# Patient Record
Sex: FEMALE | NOT HISPANIC OR LATINO | Employment: OTHER | URBAN - NONMETROPOLITAN AREA
[De-identification: names, ages, dates, MRNs, and addresses within clinical notes are randomized per-mention and may not be internally consistent; named-entity substitution may affect disease eponyms.]

---

## 2017-08-14 ENCOUNTER — OFFICE VISIT (OUTPATIENT)
Dept: MEDICAL GROUP | Facility: PHYSICIAN GROUP | Age: 67
End: 2017-08-14
Payer: MEDICARE

## 2017-08-14 VITALS
RESPIRATION RATE: 16 BRPM | BODY MASS INDEX: 32.14 KG/M2 | OXYGEN SATURATION: 97 % | WEIGHT: 200 LBS | HEART RATE: 74 BPM | TEMPERATURE: 98.1 F | DIASTOLIC BLOOD PRESSURE: 84 MMHG | HEIGHT: 66 IN | SYSTOLIC BLOOD PRESSURE: 136 MMHG

## 2017-08-14 DIAGNOSIS — I10 ESSENTIAL HYPERTENSION: ICD-10-CM

## 2017-08-14 DIAGNOSIS — E03.9 HYPOTHYROIDISM, UNSPECIFIED TYPE: ICD-10-CM

## 2017-08-14 DIAGNOSIS — Z12.31 ENCOUNTER FOR SCREENING MAMMOGRAM FOR MALIGNANT NEOPLASM OF BREAST: ICD-10-CM

## 2017-08-14 DIAGNOSIS — E78.5 DYSLIPIDEMIA: ICD-10-CM

## 2017-08-14 PROCEDURE — 99214 OFFICE O/P EST MOD 30 MIN: CPT | Performed by: FAMILY MEDICINE

## 2017-08-14 RX ORDER — HYDROCHLOROTHIAZIDE 25 MG/1
25 TABLET ORAL DAILY
Qty: 90 TAB | Refills: 3 | Status: SHIPPED | OUTPATIENT
Start: 2017-08-14 | End: 2018-07-21 | Stop reason: SDUPTHER

## 2017-08-14 RX ORDER — LEVOTHYROXINE SODIUM 0.2 MG/1
200 TABLET ORAL DAILY
Qty: 90 TAB | Refills: 3 | Status: SHIPPED | OUTPATIENT
Start: 2017-08-14 | End: 2018-09-19 | Stop reason: SDUPTHER

## 2017-08-14 ASSESSMENT — PATIENT HEALTH QUESTIONNAIRE - PHQ9: CLINICAL INTERPRETATION OF PHQ2 SCORE: 0

## 2017-08-14 NOTE — ASSESSMENT & PLAN NOTE
Recent labs at [place]        HDL 53  Since HDL above 40 advised pt to continue with exercise and diet changes.

## 2017-08-14 NOTE — MR AVS SNAPSHOT
"        Mojgan Murdockario   2017 2:20 PM   Office Visit   MRN: [id number]    Department:  [facility]   Dept Phone:  [phone]    Description:  Female : 1950   Provider:  Mike Valdez M.D.           Reason for Visit     Results labs    Medication Refill           Allergies as of 2017     Allergen Noted Reactions    Betadine [Povidone Iodine] 10/23/2009   Contact Dermatitis    Sulfa Drugs 10/23/2009   Rash      You were diagnosed with     Essential hypertension   [1744359]       Hypothyroidism, unspecified type   [9512568]       Dyslipidemia   [813686]       Encounter for screening mammogram for malignant neoplasm of breast   [[id number]]         Vital Signs     Blood Pressure Pulse Temperature Respirations Height Weight    136/84 mmHg 74 36.7 °C (98.1 °F) 16 1.676 m (5' 5.98\") 90.719 kg (200 lb)    Body Mass Index Oxygen Saturation Smoking Status             32.30 kg/m2 97% Former Smoker         Basic Information     Date Of Birth Sex Race Ethnicity Preferred Language    1950 Female Unknown Non- English      Your appointments     2018 10:40 AM   Established Patient with Mike Valdez M.D.   [address] [address] [phone]           You will be receiving a confirmation call a few days before your appointment from our automated call confirmation system.              Problem List              ICD-10-CM Priority Class Noted - Resolved    HTN (hypertension) I10   Unknown - Present    Hypothyroid E03.9   Unknown - Present    Plantar fasciitis M72.2   6/3/2010 - Present    Senile nuclear sclerosis H25.10   3/17/2015 - Present    Dyslipidemia E78.5   2017 - Present      Health Maintenance        Date Due Completion Dates    IMM DTaP/Tdap/Td Vaccine (1 - Tdap) 1971 ---    IMM ZOSTER VACCINE 2012 ---    MAMMOGRAM 2015 (Done), 2013 (Prv Comp)    Override on 2014: " Done (gbi)    Override on 7/9/2013: Previously completed    PAP SMEAR 10/9/2016 10/9/2013 (Declined)    Override on 10/9/2013: Patient Declined    BONE DENSITY 5/24/2017 ---    IMM PNEUMOCOCCAL 65+ (ADULT) LOW/MEDIUM RISK SERIES (1 of 2 - PCV13) 5/24/2017 ---    IMM INFLUENZA (1) 9/1/2017 ---    COLONOSCOPY 10/9/2023 10/9/2013 (Declined)    Override on 10/9/2013: Patient Declined            Current Immunizations     No immunizations on file.      Below and/or attached are the medications your provider expects you to take. Review all of your home medications and newly ordered medications with your provider and/or pharmacist. Follow medication instructions as directed by your provider and/or pharmacist. Please keep your medication list with you and share with your provider. Update the information when medications are discontinued, doses are changed, or new medications (including over-the-counter products) are added; and carry medication information at all times in the event of emergency situations     Allergies:  BETADINE - Contact Dermatitis     SULFA DRUGS - Rash               Medications  Valid as of: August 14, 2017 -  3:02 PM    Generic Name Brand Name Tablet Size Instructions for use    Aspirin (Tab) aspirin 81 MG Take 81 mg by mouth every day.        Coenzyme Q10 (Cap) coenzyme Q-10 30 MG Take 100 mg by mouth every day.        Cyclobenzaprine HCl (Tab) FLEXERIL 10 MG TAKE ONE TABLET BY MOUTH THREE TIMES DAILY AS NEEDED FOR MILD PAIN        Diclofenac Sodium (Tablet Delayed Response) VOLTAREN 75 MG Take 1 Tab by mouth 2 times a day.        HydroCHLOROthiazide (Tab) HYDRODIURIL 25 MG Take 1 Tab by mouth every day.        Hydrocodone-Acetaminophen (Tab) NORCO 5-325 MG Take 1-2 Tabs by mouth every 6 hours as needed.        Levothyroxine Sodium (Tab) SYNTHROID 200 MCG Take 1 Tab by mouth every day.        Multiple Vitamin   Take  by mouth.        Multiple Vitamins-Minerals (Tab) THERAGRAN-M  Take 1 Tab by mouth  every day.        Naproxen (Tab) NAPROSYN 500 MG Take 500 mg by mouth as needed.        Non Formulary Request Non Formulary Request  Take 595 mg by mouth every day. Potassium        Non Formulary Request Non Formulary Request  Take 12.5 mg by mouth every day. IODINE        Potassium Chloride   Take  by mouth.        Red Yeast Rice Extract (Tab) Red Yeast Rice 600 MG Take 1,200 mg by mouth every day.        TraMADol HCl (Tab) ULTRAM 50 MG Take 1 Tab by mouth at bedtime as needed for Mild Pain.        .                 Medicines prescribed today were sent to:     [facility] [address] [place] [address], [place] - [address] [facility]    [address] [facility] [place] [address]    Phone: [phone] Fax: [phone]    Open 24 Hours?: No      Medication refill instructions:       If your prescription bottle indicates you have medication refills left, it is not necessary to call your provider’s office. Please contact your pharmacy and they will refill your medication.    If your prescription bottle indicates you do not have any refills left, you may request refills at any time through one of the following ways: The online [employer] system (except Urgent Care), by calling your provider’s office, or by asking your pharmacy to contact your provider’s office with a refill request. Medication refills are processed only during regular business hours and may not be available until the next business day. Your provider may request additional information or to have a follow-up visit with you prior to refilling your medication.   *Please Note: Medication refills are assigned a new Rx number when refilled electronically. Your pharmacy may indicate that no refills were authorized even though a new prescription for the same medication is available at the pharmacy. Please request the medicine by name with the pharmacy before contacting your provider for a refill.           [employer] Access Code: Activation code not generated  Current [employer]  Status: Active

## 2017-08-14 NOTE — ASSESSMENT & PLAN NOTE
Well controlled. She takes hctz 25 mg   Continues on aspirin 81mg  She used to be a heavy smoker but quit >10 years ago  No chest pain with exercise.   No swelling in her legs.   She has gradually lost 40 lbs with exercise like swimming.

## 2017-08-16 ENCOUNTER — TELEPHONE (OUTPATIENT)
Dept: MEDICAL GROUP | Facility: PHYSICIAN GROUP | Age: 67
End: 2017-08-16

## 2017-08-16 DIAGNOSIS — R01.1 SYSTOLIC MURMUR: ICD-10-CM

## 2017-08-16 NOTE — PROGRESS NOTES
Subjective:   Mojgan Pierre is a 67 y.o. female here today for evaluation and management of:     HTN (hypertension)  Well controlled. She takes hctz 25 mg   Continues on aspirin 81mg  She used to be a heavy smoker but quit >10 years ago  No chest pain with exercise.   No swelling in her legs.   She has gradually lost 40 lbs with exercise like swimming.     Hypothyroid  TSH 1.10 normal range on levothyroxine 200mcg daily.     Dyslipidemia  Recent labs at [place]        HDL 53  Since HDL above 40 advised pt to continue with exercise and diet changes.          Current medicines (including changes today)  Current Outpatient Prescriptions   Medication Sig Dispense Refill   • aspirin 81 MG tablet Take 81 mg by mouth every day.     • Multiple Vitamin (MULTI VITAMIN PO) Take  by mouth.     • hydrochlorothiazide (HYDRODIURIL) 25 MG Tab Take 1 Tab by mouth every day. 90 Tab 3   • levothyroxine (SYNTHROID) 200 MCG Tab Take 1 Tab by mouth every day. 90 Tab 3   • POTASSIUM CHLORIDE PO Take  by mouth.     • hydrocodone-acetaminophen (NORCO) 5-325 MG TABS per tablet Take 1-2 Tabs by mouth every 6 hours as needed. 20 Tab 0   • diclofenac EC (VOLTAREN) 75 MG TBEC Take 1 Tab by mouth 2 times a day. 30 Tab 0   • naproxen (NAPROSYN) 500 MG TABS Take 500 mg by mouth as needed.     • therapeutic multivitamin-minerals (THERAGRAN-M) TABS Take 1 Tab by mouth every day.     • Red Yeast Rice 600 MG TABS Take 1,200 mg by mouth every day.     • coenzyme Q-10 30 MG capsule Take 100 mg by mouth every day.     • Non Formulary Request Take 595 mg by mouth every day. Potassium     • Non Formulary Request Take 12.5 mg by mouth every day. IODINE     • cyclobenzaprine (FLEXERIL) 10 MG TABS TAKE ONE TABLET BY MOUTH THREE TIMES DAILY AS NEEDED FOR MILD PAIN 30 Tab 0   • tramadol (ULTRAM) 50 MG TABS Take 1 Tab by mouth at bedtime as needed for Mild Pain. 30 Each 3     No current facility-administered medications for this visit.  "    She  has a past medical history of HTN (hypertension); Hypothyroid; and Unspecified cataract. She also has no past medical history of COPD, Diabetes, ASTHMA, or EMPHYSEMA.    ROS  No chest pain, no shortness of breath, no abdominal pain       Objective:     Blood pressure 136/84, pulse 74, temperature 36.7 °C (98.1 °F), resp. rate 16, height 1.676 m (5' 5.98\"), weight 90.719 kg (200 lb), SpO2 97 %. Body mass index is 32.3 kg/(m^2).   Physical Exam:  Constitutional: Alert, no distress.  Skin: Warm, dry, good turgor, no rashes in visible areas.  Eye: Equal, round and reactive, conjunctiva clear, lids normal.  ENMT: Lips without lesions, good dentition, oropharynx clear.  Neck: Trachea midline, no masses, no thyromegaly. No cervical or supraclavicular lymphadenopathy  Respiratory: Unlabored respiratory effort, lungs clear to auscultation, no wheezes, no ronchi.  Cardiovascular: Normal S1, S2, soft systolic murmur, no edema.  Abdomen: Soft, non-tender, no masses, no hepatosplenomegaly.  Psych: Alert and oriented x3, normal affect and mood.        Assessment and Plan:   The following treatment plan was discussed    1. Essential hypertension  Well controlled. Continue current medication  - hydrochlorothiazide (HYDRODIURIL) 25 MG Tab; Take 1 Tab by mouth every day.  Dispense: 90 Tab; Refill: 3    2. Hypothyroidism, unspecified type  Stable continue current medication  - levothyroxine (SYNTHROID) 200 MCG Tab; Take 1 Tab by mouth every day.  Dispense: 90 Tab; Refill: 3    3. Dyslipidemia  Continue diet and activity modification    4. Encounter for screening mammogram for malignant neoplasm of breast  - MA-SCREEN MAMMO W/CAD-BILAT      Followup: Return in about 6 months (around 2/14/2018) for HTN, hypothyroidism. .           "

## 2017-12-12 ENCOUNTER — OFFICE VISIT (OUTPATIENT)
Dept: MEDICAL GROUP | Facility: PHYSICIAN GROUP | Age: 67
End: 2017-12-12
Payer: MEDICARE

## 2017-12-12 VITALS
HEIGHT: 66 IN | WEIGHT: 198 LBS | TEMPERATURE: 98 F | HEART RATE: 74 BPM | DIASTOLIC BLOOD PRESSURE: 82 MMHG | RESPIRATION RATE: 16 BRPM | SYSTOLIC BLOOD PRESSURE: 134 MMHG | BODY MASS INDEX: 31.82 KG/M2 | OXYGEN SATURATION: 97 %

## 2017-12-12 DIAGNOSIS — E78.5 DYSLIPIDEMIA: ICD-10-CM

## 2017-12-12 DIAGNOSIS — E66.9 OBESITY (BMI 30-39.9): ICD-10-CM

## 2017-12-12 DIAGNOSIS — Z87.891 HISTORY OF TOBACCO USE: ICD-10-CM

## 2017-12-12 DIAGNOSIS — Z23 NEED FOR VACCINATION: ICD-10-CM

## 2017-12-12 DIAGNOSIS — E03.9 HYPOTHYROIDISM, UNSPECIFIED TYPE: ICD-10-CM

## 2017-12-12 DIAGNOSIS — Z78.0 MENOPAUSE: ICD-10-CM

## 2017-12-12 DIAGNOSIS — H90.3 SENSORINEURAL HEARING LOSS (SNHL) OF BOTH EARS: ICD-10-CM

## 2017-12-12 DIAGNOSIS — Z12.2 ENCOUNTER FOR SCREENING FOR LUNG CANCER: ICD-10-CM

## 2017-12-12 DIAGNOSIS — I10 ESSENTIAL HYPERTENSION: ICD-10-CM

## 2017-12-12 PROCEDURE — 90715 TDAP VACCINE 7 YRS/> IM: CPT | Performed by: FAMILY MEDICINE

## 2017-12-12 PROCEDURE — G0008 ADMIN INFLUENZA VIRUS VAC: HCPCS | Performed by: FAMILY MEDICINE

## 2017-12-12 PROCEDURE — 90670 PCV13 VACCINE IM: CPT | Performed by: FAMILY MEDICINE

## 2017-12-12 PROCEDURE — G0402 INITIAL PREVENTIVE EXAM: HCPCS | Mod: 25 | Performed by: FAMILY MEDICINE

## 2017-12-12 PROCEDURE — G0009 ADMIN PNEUMOCOCCAL VACCINE: HCPCS | Performed by: FAMILY MEDICINE

## 2017-12-12 PROCEDURE — 90472 IMMUNIZATION ADMIN EACH ADD: CPT | Performed by: FAMILY MEDICINE

## 2017-12-12 PROCEDURE — 90662 IIV NO PRSV INCREASED AG IM: CPT | Performed by: FAMILY MEDICINE

## 2017-12-12 RX ORDER — RANITIDINE 150 MG/1
150 TABLET ORAL 2 TIMES DAILY
Qty: 60 TAB | Refills: 11 | Status: ON HOLD | OUTPATIENT
Start: 2017-12-12 | End: 2018-06-05

## 2017-12-12 ASSESSMENT — PATIENT HEALTH QUESTIONNAIRE - PHQ9: CLINICAL INTERPRETATION OF PHQ2 SCORE: 0

## 2017-12-12 NOTE — ASSESSMENT & PLAN NOTE
August 2017 labs at [place]       TG 11  HDL 53    Chronic condition, advised on diet and exercise.   Recheck in a year.

## 2017-12-12 NOTE — PROGRESS NOTES
Chief Complaint   Patient presents with   • Annual Exam     Medicare   • Immunizations     FLU /PNEU / TDAP / ZOSTER         HPI:  Mojgan is a 67 y.o. here for Medicare Annual Wellness Visit        Patient Active Problem List    Diagnosis Date Noted   • Dyslipidemia 08/14/2017   • Senile nuclear sclerosis 03/17/2015   • Plantar fasciitis 06/03/2010   • HTN (hypertension)    • Hypothyroid        Current Outpatient Prescriptions   Medication Sig Dispense Refill   • Multiple Vitamin (MULTI VITAMIN PO) Take  by mouth.     • hydrochlorothiazide (HYDRODIURIL) 25 MG Tab Take 1 Tab by mouth every day. 90 Tab 3   • levothyroxine (SYNTHROID) 200 MCG Tab Take 1 Tab by mouth every day. 90 Tab 3   • aspirin 81 MG tablet Take 81 mg by mouth every day.     • POTASSIUM CHLORIDE PO Take  by mouth.     • hydrocodone-acetaminophen (NORCO) 5-325 MG TABS per tablet Take 1-2 Tabs by mouth every 6 hours as needed. 20 Tab 0   • diclofenac EC (VOLTAREN) 75 MG TBEC Take 1 Tab by mouth 2 times a day. 30 Tab 0   • naproxen (NAPROSYN) 500 MG TABS Take 500 mg by mouth as needed.     • therapeutic multivitamin-minerals (THERAGRAN-M) TABS Take 1 Tab by mouth every day.     • Red Yeast Rice 600 MG TABS Take 1,200 mg by mouth every day.     • coenzyme Q-10 30 MG capsule Take 100 mg by mouth every day.     • Non Formulary Request Take 595 mg by mouth every day. Potassium     • Non Formulary Request Take 12.5 mg by mouth every day. IODINE     • cyclobenzaprine (FLEXERIL) 10 MG TABS TAKE ONE TABLET BY MOUTH THREE TIMES DAILY AS NEEDED FOR MILD PAIN 30 Tab 0   • tramadol (ULTRAM) 50 MG TABS Take 1 Tab by mouth at bedtime as needed for Mild Pain. 30 Each 3     No current facility-administered medications for this visit.         Patient is taking medications as noted in medication list.  Current supplements as per medication list.     Allergies: Betadine [povidone iodine] and Sulfa drugs    Current social contact/activities: yard work/ part time job      Is  patient current with immunizations? No, due for FLU, PNEUMOVAX (PPSV23), TDAP and ZOSTAVAX (Shingles). Patient is interested in receiving FLU, PNEUMOVAX (PPSV23), TDAP and ZOSTAVAX (Shingles) today.    She  reports that she quit smoking about 9 years ago. Her smoking use included Cigarettes. She has a 60.00 pack-year smoking history. She has never used smokeless tobacco. She reports that she drinks alcohol. She reports that she does not use drugs.  Counseling given: Not Answered        DPA/Advanced directive: Patient has Advanced Directive, but it is not on file. Instructed to bring in a copy to scan into their chart.    ROS:    Gait: Uses no assistive device   Ostomy: no   Other tubes: no   Amputations: no   Chronic oxygen use no   Last eye exam cataract recently done, will request records, Dr Manning in [place]   Wears hearing aids: no   : Denies any urinary leakage during the last 6 months      Screening:        Depression Screening    Little interest or pleasure in doing things?  0 - not at all  Feeling down, depressed, or hopeless? 0 - not at all  Patient Health Questionnaire Score: 0    If depressive symptoms identified deferred to follow up visit unless specifically addressed in assessment and plan.    Interpretation of PHQ-9 Total Score   Score Severity   1-4 No Depression   5-9 Mild Depression   10-14 Moderate Depression   15-19 Moderately Severe Depression   20-27 Severe Depression    Screening for Cognitive Impairment    Three Minute Recall (apple, watch, joshua)  3/3    Draw clock face with all 12 numbers set to the hand to show 10 minutes past 11 o'clock  1    If cognitive concerns identified, deferred for follow up unless specifically addressed in assessment and plan.    Fall Risk Assessment    Has the patient had two or more falls in the last year or any fall with injury in the last year?  No  If fall risk identified, deferred for follow up unless specifically addressed in assessment and plan.    Safety  Assessment    Throw rugs on floor.  Yes  Handrails on all stairs.  No  Good lighting in all hallways.  Yes  Difficulty hearing.  Yes  Patient counseled about all safety risks that were identified.    Functional Assessment ADLs    Are there any barriers preventing you from cooking for yourself or meeting nutritional needs?  No.    Are there any barriers preventing you from driving safely or obtaining transportation?  No.    Are there any barriers preventing you from using a telephone or calling for help?  No.    Are there any barriers preventing you from shopping?  No.    Are there any barriers preventing you from taking care of your own finances?  No.    Are there any barriers preventing you from managing your medications?  No.    Are you currently engaging any exercise or physical activity?  Yes.       Health Maintenance Summary                Annual Wellness Visit Overdue 1950     IMM DTaP/Tdap/Td Vaccine Overdue 5/24/1971     IMM ZOSTER VACCINE Overdue 5/24/2012     PAP SMEAR Overdue 10/9/2016      Patient Declined 10/9/2013     BONE DENSITY Overdue 5/24/2017     IMM PNEUMOCOCCAL 65+ (ADULT) LOW/MEDIUM RISK SERIES Overdue 5/24/2017     IMM INFLUENZA Overdue 9/1/2017     MAMMOGRAM Next Due 9/12/2018      Done 9/12/2017 [id number]     Patient has more history with this topic...    COLONOSCOPY Next Due 10/9/2023      Patient Declined 10/9/2013           Patient Care Team:  Mike Valdez M.D. as PCP - General (Family Medicine)    Social History   Substance Use Topics   • Smoking status: Former Smoker     Packs/day: 2.00     Years: 30.00     Types: Cigarettes     Quit date: 12/5/2008   • Smokeless tobacco: Never Used      Comment: 50 pyh   • Alcohol use Yes      Comment: occ     No family history on file.  She  has a past medical history of HTN (hypertension); Hypothyroid; and Unspecified cataract. She also has no past medical history of ASTHMA; COPD; Diabetes; or EMPHYSEMA.   Past Surgical  History:   Procedure Laterality Date   • CATARACT PHACO WITH IOL  3/17/2015    Performed by Gabriel Manning M.D. at [facility]           Exam:     There were no vitals taken for this visit. There is no height or weight on file to calculate BMI.    Hearing poor.    Dentition good  Alert, oriented in no acute distress.  Eye contact is good, speech goal directed, affect calm      Assessment and Plan. The following treatment and monitoring plan is recommended:    1. Need for vaccination    - INFLUENZA VACCINE, HIGH DOSE (65+ ONLY)  - PNEUMOCOCCAL CONJUGATE VACCINE 13-VALENT  - Tdap =>6yo IM    2. Encounter for screening for lung cancer  - REFERRAL TO LUNG CANCER SCREENING PROGRAM    3. History of tobacco use  - REFERRAL TO LUNG CANCER SCREENING PROGRAM    4. Menopause  - DS-BONE DENSITY STUDY (DEXA); Future    Services suggested: No services needed at this time  Health Care Screening recommendations as per orders if indicated.  Referrals offered: PT/OT/Nutrition counseling/Behavioral Health/Smoking cessation as per orders if indicated.    Discussion today about general wellness and lifestyle habits:    · Prevent falls and reduce trip hazards; Cautioned about securing or removing rugs.  · Have a working fire alarm and carbon monoxide detector;   · Engage in regular physical activity and social activities       Follow-up: No Follow-up on file.

## 2017-12-12 NOTE — ASSESSMENT & PLAN NOTE
Chronic condition, controlled on hctz 25 mg, continues on ASA 81 mg, she quit smoking, advised to continue exercise like swimming for weight loss.

## 2017-12-18 ENCOUNTER — TELEPHONE (OUTPATIENT)
Dept: HEMATOLOGY ONCOLOGY | Facility: MEDICAL CENTER | Age: 67
End: 2017-12-18

## 2017-12-18 NOTE — TELEPHONE ENCOUNTER
Received referral to lung cancer screening program.  Chart review to assess for lung cancer screening program eligibility.   1. Age 55-77 yrs of age? Yes 67 y.o.  2. 30 pack year hx of smoking, or greater? Yes 2 ppdx 30 yrs=  60 pkyr hx  3. Current smoker or if quit, has pt quit within last 15 yrs?Yes  Quit smoking 12/5/2008- 9yrs ago  4. Any signs or symptoms of lung cancer? None noted  5. Previous history of lung cancer? None noted  6. Chest CT within past 12 mos.? None noted  Patient does meet eligibility criteria. [facility] scheduling notified to schedule the shared decision making visit.

## 2017-12-21 ENCOUNTER — TELEPHONE (OUTPATIENT)
Dept: HEMATOLOGY ONCOLOGY | Facility: MEDICAL CENTER | Age: 67
End: 2017-12-21

## 2017-12-21 NOTE — TELEPHONE ENCOUNTER
1st attempt to contact the patient,- left voicemail for patient requesting a return call to schedule their shared decision making appointment.   Ref: Mike Valdez Dx: Hx of Nicotine Dependence

## 2018-02-07 ENCOUNTER — OFFICE VISIT (OUTPATIENT)
Dept: HEMATOLOGY ONCOLOGY | Facility: MEDICAL CENTER | Age: 68
End: 2018-02-07
Payer: MEDICARE

## 2018-02-07 ENCOUNTER — HOSPITAL ENCOUNTER (OUTPATIENT)
Dept: RADIOLOGY | Facility: MEDICAL CENTER | Age: 68
End: 2018-02-07
Attending: FAMILY MEDICINE
Payer: MEDICARE

## 2018-02-07 VITALS
RESPIRATION RATE: 18 BRPM | WEIGHT: 198.97 LBS | BODY MASS INDEX: 33.15 KG/M2 | OXYGEN SATURATION: 97 % | TEMPERATURE: 98.2 F | SYSTOLIC BLOOD PRESSURE: 136 MMHG | HEIGHT: 65 IN | DIASTOLIC BLOOD PRESSURE: 82 MMHG | HEART RATE: 59 BPM

## 2018-02-07 DIAGNOSIS — Z78.0 MENOPAUSE: ICD-10-CM

## 2018-02-07 DIAGNOSIS — Z87.891 PERSONAL HISTORY OF TOBACCO USE, PRESENTING HAZARDS TO HEALTH: ICD-10-CM

## 2018-02-07 PROCEDURE — G0296 VISIT TO DETERM LDCT ELIG: HCPCS | Performed by: NURSE PRACTITIONER

## 2018-02-07 PROCEDURE — 77080 DXA BONE DENSITY AXIAL: CPT

## 2018-02-07 ASSESSMENT — ENCOUNTER SYMPTOMS
WHEEZING: 0
HEMOPTYSIS: 0
WEIGHT LOSS: 0
BACK PAIN: 1
COUGH: 1
SHORTNESS OF BREATH: 1
SPUTUM PRODUCTION: 0

## 2018-02-07 ASSESSMENT — PATIENT HEALTH QUESTIONNAIRE - PHQ9: CLINICAL INTERPRETATION OF PHQ2 SCORE: 0

## 2018-02-07 ASSESSMENT — PAIN SCALES - GENERAL: PAINLEVEL: NO PAIN

## 2018-02-07 NOTE — PROGRESS NOTES
"Subjective:      Mojgan Austin is a 67 y.o. female who presents for Lung Cancer Screening Program Prescreen (Ref: Mike Valdez Dx: Hx of nicotine dependence) for lung cancer screening shared decision making visit.        HPI   Patient seen today for initial lung cancer screening visit. Patient referred by PCP, Mike Valdez MD.     The patient meets eligibility criteria including age, smoking history (30+ pack years), if former smoker, quit in the last 15 years, and absence of signs or symptoms of lung cancer.    - Age - 67  - Smoking history - Patient has smoked for 30 years at an average of 2 ppd = 60 pack year smoking history.  - Current smoking status - Former smoker, quit 12/2017  - No symptoms of lung cancer and no previous history of lung cancer     Allergies   Allergen Reactions   • Betadine [Povidone Iodine] Contact Dermatitis   • Sulfa Drugs Rash       Current Outpatient Prescriptions on File Prior to Visit   Medication Sig Dispense Refill   • aspirin 81 MG tablet Take 81 mg by mouth every day.     • hydrochlorothiazide (HYDRODIURIL) 25 MG Tab Take 1 Tab by mouth every day. 90 Tab 3   • levothyroxine (SYNTHROID) 200 MCG Tab Take 1 Tab by mouth every day. 90 Tab 3   • ranitidine (ZANTAC) 150 MG Tab Take 1 Tab by mouth 2 times a day. 60 Tab 11   • Multiple Vitamin (MULTI VITAMIN PO) Take  by mouth.       No current facility-administered medications on file prior to visit.        Review of Systems   Constitutional: Positive for malaise/fatigue (Moreso since starting harder job). Negative for weight loss.   Respiratory: Positive for cough (post nasal drip) and shortness of breath (with exertion). Negative for hemoptysis, sputum production and wheezing.    Musculoskeletal: Positive for back pain (some aching from work).        Objective:     /82   Pulse (!) 59   Temp 36.8 °C (98.2 °F)   Resp 18   Ht 1.651 m (5' 5\")   Wt 90.2 kg (198 lb 15.4 oz)   SpO2 97%   BMI 33.11 kg/m²  "     Physical Exam   Constitutional: She is oriented to person, place, and time. She appears well-developed and well-nourished. No distress.   Cardiovascular: Normal rate, regular rhythm and normal heart sounds.  Exam reveals no gallop and no friction rub.    No murmur heard.  Pulmonary/Chest: Effort normal and breath sounds normal. No respiratory distress. She has no wheezes.   Musculoskeletal: Normal range of motion.   Neurological: She is alert and oriented to person, place, and time.   Skin: Skin is warm and dry. She is not diaphoretic.   Vitals reviewed.       Assessment/Plan:     1. Personal history of tobacco use, presenting hazards to health  CT-LUNG CANCER-SCREENING       We conducted a shared decision-making process using a decision aid. We reviewed benefits and harms of screening, including false positives and potential need for additional diagnostic testing, the possibility of over diagnosis, and total radiation exposure.    We discussed the importance of adhering to annual LDCT screening. We also discussed the impact of comorbities on the patient's the ability or willingness to undergo diagnostic procedure(s) and treatment.    Counseling on the importance of maintaining cigarette smoking abstinence if former smoker; or the importance of smoking cessation if current smoker and, if appropriate, furnishing of information about tobacco cessation interventions.    Based on our discussion, we have decided to begin annual lung cancer screening starting now.

## 2018-03-19 ENCOUNTER — OFFICE VISIT (OUTPATIENT)
Dept: URGENT CARE | Facility: PHYSICIAN GROUP | Age: 68
End: 2018-03-19
Payer: MEDICARE

## 2018-03-19 VITALS
DIASTOLIC BLOOD PRESSURE: 90 MMHG | OXYGEN SATURATION: 97 % | HEART RATE: 94 BPM | TEMPERATURE: 98.6 F | WEIGHT: 195 LBS | HEIGHT: 65 IN | BODY MASS INDEX: 32.49 KG/M2 | SYSTOLIC BLOOD PRESSURE: 130 MMHG | RESPIRATION RATE: 18 BRPM

## 2018-03-19 DIAGNOSIS — R05.9 COUGH: ICD-10-CM

## 2018-03-19 DIAGNOSIS — J06.9 ACUTE URI: ICD-10-CM

## 2018-03-19 PROCEDURE — 99214 OFFICE O/P EST MOD 30 MIN: CPT | Performed by: FAMILY MEDICINE

## 2018-03-19 RX ORDER — BENZONATATE 200 MG/1
200 CAPSULE ORAL 3 TIMES DAILY PRN
Qty: 30 CAP | Refills: 0 | Status: SHIPPED | OUTPATIENT
Start: 2018-03-19 | End: 2018-05-15

## 2018-03-19 RX ORDER — DOXYCYCLINE HYCLATE 100 MG
100 TABLET ORAL 2 TIMES DAILY
Qty: 20 TAB | Refills: 0 | Status: SHIPPED | OUTPATIENT
Start: 2018-03-19 | End: 2018-03-29

## 2018-03-19 ASSESSMENT — ENCOUNTER SYMPTOMS
EYE DISCHARGE: 0
EYE REDNESS: 0
HEADACHES: 0
WEIGHT LOSS: 0

## 2018-03-19 NOTE — PROGRESS NOTES
"Subjective:      Mojgan Austin is a 67 y.o. female who presents with URI (Cold Sx)            2-3 days productive cough without blood in sputum. Nasal congestion. ST. No SOB +wheeze. No PMH asthma/pneumonia. Min relief with OTC theraflu. Some relief with resting. No other aggravating or alleviating factors.          Review of Systems   Constitutional: Positive for malaise/fatigue. Negative for weight loss.   HENT: Positive for ear pain. Negative for ear discharge.    Eyes: Negative for discharge and redness.   Neurological: Negative for headaches.     .  Medications, Allergies, and current problem list reviewed today in Epic       Objective:     /90   Pulse 94   Temp 37 °C (98.6 °F)   Resp 18   Ht 1.651 m (5' 5\")   Wt 88.5 kg (195 lb)   SpO2 97%   BMI 32.45 kg/m²      Physical Exam   Constitutional: She appears well-developed and well-nourished. No distress.   HENT:   Head: Normocephalic and atraumatic.   Nasal congestion  Pharynx red without exudate     Eyes: Conjunctivae are normal.   Cardiovascular: Normal rate, regular rhythm and normal heart sounds.    Pulmonary/Chest: Effort normal and breath sounds normal. She has no wheezes.   Neurological:   Speech is clear. Patient is appropriate and cooperative.     Skin: Skin is warm and dry. No rash noted.               Assessment/Plan:     1. Acute URI     2. Cough  benzonatate (TESSALON) 200 MG capsule    doxycycline (VIBRAMYCIN) 100 MG Tab     Differential diagnosis, natural history, supportive care, and indications for immediate follow-up discussed at length.   Contingent antibiotic prescription given to patient to fill upon meeting criteria of guidelines discussed.       "

## 2018-03-19 NOTE — LETTER
March 19, 2018         Patient: Mojgan Austin   YOB: 1950   Date of Visit: 3/19/2018           To Whom it May Concern:    Mojgan Austin was seen in my clinic on 3/19/2018. Please excuse 3/20/2018.      Sincerely,           Fitz Valencia M.D.  Electronically Signed

## 2018-03-21 ENCOUNTER — OFFICE VISIT (OUTPATIENT)
Dept: MEDICAL GROUP | Facility: PHYSICIAN GROUP | Age: 68
End: 2018-03-21
Payer: MEDICARE

## 2018-03-21 ENCOUNTER — HOSPITAL ENCOUNTER (OUTPATIENT)
Facility: MEDICAL CENTER | Age: 68
End: 2018-03-21
Attending: FAMILY MEDICINE
Payer: MEDICARE

## 2018-03-21 VITALS
HEART RATE: 84 BPM | OXYGEN SATURATION: 96 % | SYSTOLIC BLOOD PRESSURE: 130 MMHG | WEIGHT: 199 LBS | RESPIRATION RATE: 18 BRPM | DIASTOLIC BLOOD PRESSURE: 80 MMHG | BODY MASS INDEX: 33.15 KG/M2 | TEMPERATURE: 98.6 F | HEIGHT: 65 IN

## 2018-03-21 DIAGNOSIS — E78.5 DYSLIPIDEMIA: ICD-10-CM

## 2018-03-21 DIAGNOSIS — Z12.4 CERVICAL CANCER SCREENING: ICD-10-CM

## 2018-03-21 DIAGNOSIS — I10 ESSENTIAL HYPERTENSION: ICD-10-CM

## 2018-03-21 DIAGNOSIS — E03.9 HYPOTHYROIDISM, UNSPECIFIED TYPE: ICD-10-CM

## 2018-03-21 PROCEDURE — G0101 CA SCREEN;PELVIC/BREAST EXAM: HCPCS | Performed by: FAMILY MEDICINE

## 2018-03-21 PROCEDURE — 87624 HPV HI-RISK TYP POOLED RSLT: CPT

## 2018-03-21 PROCEDURE — 88175 CYTOPATH C/V AUTO FLUID REDO: CPT

## 2018-03-21 NOTE — ASSESSMENT & PLAN NOTE
She notes last pap was many years ago. She has no family hx of cervical cancer pt with no abnormal paps. She has no vaginal bleeding.

## 2018-03-21 NOTE — PROGRESS NOTES
"Subjective:   Mojgan Austin is a 67 y.o. female here today for evaluation and management of:     Cervical cancer screening  She notes last pap was many years ago. She has no family hx of cervical cancer pt with no abnormal paps. She has no vaginal bleeding.          Current medicines (including changes today)  Current Outpatient Prescriptions   Medication Sig Dispense Refill   • benzonatate (TESSALON) 200 MG capsule Take 1 Cap by mouth 3 times a day as needed for Cough. 30 Cap 0   • doxycycline (VIBRAMYCIN) 100 MG Tab Take 1 Tab by mouth 2 times a day for 10 days. 20 Tab 0   • ranitidine (ZANTAC) 150 MG Tab Take 1 Tab by mouth 2 times a day. 60 Tab 11   • aspirin 81 MG tablet Take 81 mg by mouth every day.     • Multiple Vitamin (MULTI VITAMIN PO) Take  by mouth.     • hydrochlorothiazide (HYDRODIURIL) 25 MG Tab Take 1 Tab by mouth every day. 90 Tab 3   • levothyroxine (SYNTHROID) 200 MCG Tab Take 1 Tab by mouth every day. 90 Tab 3     No current facility-administered medications for this visit.      She  has a past medical history of HTN (hypertension); Hypothyroid; and Unspecified cataract. She also has no past medical history of ASTHMA; COPD; Diabetes; or EMPHYSEMA.    ROS  No chest pain, no shortness of breath, no abdominal pain       Objective:     Blood pressure 130/80, pulse 84, temperature 37 °C (98.6 °F), resp. rate 18, height 1.651 m (5' 5\"), weight 90.3 kg (199 lb), SpO2 96 %. Body mass index is 33.12 kg/m².   Physical Exam:  Constitutional: Alert, no distress.  Skin: Warm, dry, good turgor, no rashes in visible areas.  Eye: Equal, round and reactive, conjunctiva clear, lids normal.  ENMT: Lips without lesions, good dentition, oropharynx clear.  Neck: Trachea midline, no masses, no thyromegaly. No cervical or supraclavicular lymphadenopathy  Respiratory: Unlabored respiratory effort, lungs clear to auscultation, no wheezes, no ronchi.  Cardiovascular: Normal S1, S2, no murmur, no " edema.  Abdomen: Soft, non-tender, no masses, no hepatosplenomegaly.  Psych: Alert and oriented x3, normal affect and mood.  Normal pelvic exam: external no lesions, vaginal no lesions, cervix no lesions, no CMT, no adnexal masses uterus normal size.       Assessment and Plan:   The following treatment plan was discussed    1. Cervical cancer screening    - THINPREP PAP WITH HPV; Future      Followup: No Follow-up on file.

## 2018-03-22 ENCOUNTER — APPOINTMENT (OUTPATIENT)
Dept: RADIOLOGY | Facility: MEDICAL CENTER | Age: 68
End: 2018-03-22
Attending: NURSE PRACTITIONER
Payer: MEDICARE

## 2018-03-23 LAB
CYTOLOGY REG CYTOL: NORMAL
HPV HR 12 DNA CVX QL NAA+PROBE: NEGATIVE
HPV16 DNA SPEC QL NAA+PROBE: NEGATIVE
HPV18 DNA SPEC QL NAA+PROBE: NEGATIVE
SPECIMEN SOURCE: NORMAL

## 2018-03-27 NOTE — PROGRESS NOTES
Mojgan,  Your PAP and HPV are normal. No further paps needed. I recommend you continue with annual pelvic exams.  Mike Valdez M.D.

## 2018-03-29 ENCOUNTER — TELEPHONE (OUTPATIENT)
Dept: HEMATOLOGY ONCOLOGY | Facility: MEDICAL CENTER | Age: 68
End: 2018-03-29

## 2018-03-29 ENCOUNTER — HOSPITAL ENCOUNTER (OUTPATIENT)
Dept: RADIOLOGY | Facility: MEDICAL CENTER | Age: 68
End: 2018-03-29
Attending: NURSE PRACTITIONER
Payer: MEDICARE

## 2018-03-29 DIAGNOSIS — R91.1 LUNG NODULE: ICD-10-CM

## 2018-03-29 DIAGNOSIS — Z87.891 PERSONAL HISTORY OF TOBACCO USE, PRESENTING HAZARDS TO HEALTH: ICD-10-CM

## 2018-03-29 PROCEDURE — G0297 LDCT FOR LUNG CA SCREEN: HCPCS

## 2018-03-30 NOTE — TELEPHONE ENCOUNTER
Patient recently completed a lung cancer screening CT. There is a 1 cm mass in the posterior left lower lobe with lobulated margins. Due to patient's high risk for possible lung cancer will proceed with an IOC referral to proceed with further workup. I did contact the patient and spoke to her about the results of the phone and patient is in agreement with the plan.    I will update patient's primary care provider as well.      Ct-lung Cancer-screening    Result Date: 3/29/2018  3/29/2018 11:59 AM HISTORY/REASON FOR EXAM:  Screening for lung cancer. TECHNIQUE/EXAM DESCRIPTION AND NUMBER OF VIEWS: Lung cancer screening without contrast. Low dose noncontrast helical images were obtained of the chest from the lung apices through the costophrenic sulci utilizing thin collimation and intervals with reconstructed images sent to PACS in axial, coronal and sagittal planes. Low dose optimization technique was utilized for this CT exam including automated exposure control and adjustment of the mA and/or kV according to patient size. COMPARISON: None. FINDINGS: 1 cm mass with lobulated borders is identified posteriorly in the left upper pulmonary lobe on image 77. [place] shaped nodule measuring 5.8 mm abuts the pleural surface laterally in the right lower pulmonary lobe on image 107. Calcified granuloma is noted posteriorly in the left lower pulmonary lobe. No mediastinal or hilar adenopathy is noted. No axillary adenopathy is identified. No consolidations are noted. No pleural effusions are identified. The adrenal glands are within normal limits by CT. There is mild calcific atherosclerosis.     1.  1 cm mass identified posteriorly in left lower pulmonary lobe with lobulated margins. Recommend PET/CT or follow-up CT in 3 months. 2.  Additional 5.8 mm nodule abuts the pleural surface at the lateral edge of the right lower pulmonary lobe. 3.  Minimal calcific atherosclerosis. 4.  No adenopathy identified. Lung RADS: 4A -  Suspicious: Findings for which additional diagnostic testing and/or tissue sampling is recommended Findings: solid nodule(s) greater than or equal to 8 to less than 15 mm at baseline OR growing less than 8 mm OR new 6 to less than 8 mm part solid nodule(s): greater than or equal to 6 mm with solid component greater than or equal to 6 mm to less than 8 mm OR with a new or growing less than 4 mm solid component endobrachial nodule Management: 3 month LDCT; PET/CT may be used when there is a greater than 8 mm solid component  \      1. Lung nodule  REFERRAL TO INTAKE ONCOLOGY COORDINATOR

## 2018-03-30 NOTE — TELEPHONE ENCOUNTER
1st attempt to contact the patient- Left voicemail for patient requesting a return call to schedule an IOC appointment with KAREN Roman.   Ref:Stacey Webster/ DANIELE    Dx: Lung mass

## 2018-04-05 ENCOUNTER — OFFICE VISIT (OUTPATIENT)
Dept: HEMATOLOGY ONCOLOGY | Facility: MEDICAL CENTER | Age: 68
End: 2018-04-05
Payer: MEDICARE

## 2018-04-05 VITALS
SYSTOLIC BLOOD PRESSURE: 110 MMHG | BODY MASS INDEX: 33.11 KG/M2 | RESPIRATION RATE: 16 BRPM | TEMPERATURE: 97.3 F | WEIGHT: 198.75 LBS | OXYGEN SATURATION: 95 % | HEART RATE: 64 BPM | HEIGHT: 65 IN | DIASTOLIC BLOOD PRESSURE: 66 MMHG

## 2018-04-05 DIAGNOSIS — R91.1 LUNG NODULE: ICD-10-CM

## 2018-04-05 PROCEDURE — 99215 OFFICE O/P EST HI 40 MIN: CPT | Performed by: NURSE PRACTITIONER

## 2018-04-05 ASSESSMENT — ENCOUNTER SYMPTOMS
VOMITING: 0
DEPRESSION: 0
CONSTIPATION: 0
MYALGIAS: 0
WHEEZING: 0
SPUTUM PRODUCTION: 0
NERVOUS/ANXIOUS: 0
NAUSEA: 0
HEMOPTYSIS: 0
WEIGHT LOSS: 0
DIARRHEA: 0
COUGH: 1
CHILLS: 0
PALPITATIONS: 0
DIZZINESS: 0
SHORTNESS OF BREATH: 0
SORE THROAT: 1
FEVER: 0
INSOMNIA: 1
HEADACHES: 0

## 2018-04-05 ASSESSMENT — PAIN SCALES - GENERAL: PAINLEVEL: NO PAIN

## 2018-04-05 NOTE — PROGRESS NOTES
Subjective:      Mojgan Austin is a 67 y.o. female who presents as a New Patient   For a lung nodule.          HPI    Patient referred to me, Intake Oncology Coordinator by the lung cancer screening program for a lung nodule.  Patient is unaccompanied for today's visit.    Patient was recently seen in the lung cancer screening program and underwent a lung cancer screening CT. CT scan shows a 1 cm mass in the left lower lobe with lobulated margins. There was an additional 5.8 mm nodule that abuts the pleural surface the lateral edge of the right lower pulmonary lobe. No adenopathy was identified. I personally reviewed the film in detail as well as reviewed it with the patient today.    Patient does have a mild cough every once in a while, but describes it as a choking cough. She denies hemoptysis sputum production shortness of breath or wheezing. She is very active and has a very active job where she walks approximately 5-10 miles per day. They should swims at minimum 3 times per day and more in the summer. She stated she had what was thought to be a virus associated with a sore throat but that has been improving and almost completely resolved. She denies any fevers, chills, fatigue or weight loss. She denies any chest pain or heart palpitations, however she stated she has had a chest wall pain for the last 5 years. She has followed up with her PCP regarding this concern. She does have some mild edema in the lower extremities and does wear compression stockings intermittently. She stated this is been present for approximately 15 years and has had workup for blood clots in the past. She denies any nausea, vomiting, diarrhea or constipation. She does void without difficulty. She does have some joint pain in her knees from arthritis. She does have very mildly noted erythema on her forehead. She thinks that this waxes and wanes and more so when she swims in the public pool. She denies any dizziness or  headaches. She does trouble sleeping at times.     Please see past medical and surgical history below.    Patient is a former smoker. She smoked for approximately 40 years at 2 packs per day. She quit in January 2007.    Patient does have significant family history of cancer. Her mother, maternal uncle, maternal grandmother, and maternal grandfather all passed away from lung cancer.    Allergies   Allergen Reactions   • Betadine [Povidone Iodine] Contact Dermatitis   • Sulfa Drugs Rash     Current Outpatient Prescriptions on File Prior to Visit   Medication Sig Dispense Refill   • aspirin 81 MG tablet Take 81 mg by mouth every day.     • Multiple Vitamin (MULTI VITAMIN PO) Take  by mouth.     • hydrochlorothiazide (HYDRODIURIL) 25 MG Tab Take 1 Tab by mouth every day. 90 Tab 3   • levothyroxine (SYNTHROID) 200 MCG Tab Take 1 Tab by mouth every day. 90 Tab 3   • benzonatate (TESSALON) 200 MG capsule Take 1 Cap by mouth 3 times a day as needed for Cough. 30 Cap 0   • ranitidine (ZANTAC) 150 MG Tab Take 1 Tab by mouth 2 times a day. 60 Tab 11     No current facility-administered medications on file prior to visit.      Past Medical History:   Diagnosis Date   • HTN (hypertension)    • Hypothyroid    • Unspecified cataract      Past Surgical History:   Procedure Laterality Date   • CATARACT PHACO WITH IOL  3/17/2015    Performed by Gabriel Manning M.D. at [facility]     Family History   Problem Relation Age of Onset   • Cancer Mother      Lung cancer   • Cancer Maternal Uncle 50     Lung cancer   • Cancer Maternal Grandmother      Lung cancer   • Cancer Maternal Grandfather      Lung cancer     Social History     Social History   • Marital status: Single     Spouse name: N/A   • Number of children: 3   • Years of education: N/A     Social History Main Topics   • Smoking status: Former Smoker     Packs/day: 2.00     Years: 40.00     Types: Cigarettes     Quit date: 12/5/2007   • Smokeless tobacco: Never  "Used      Comment: 60 pack years   • Alcohol use Yes      Comment: occ   • Drug use: No   • Sexual activity: No     Other Topics Concern   • Not on file     Social History Narrative   • No narrative on file       Review of Systems   Constitutional: Negative for chills, fever, malaise/fatigue and weight loss.   HENT: Positive for sore throat (r/t a virus but it is improving).    Respiratory: Positive for cough (choking cough intermittently). Negative for hemoptysis, sputum production, shortness of breath and wheezing.    Cardiovascular: Positive for leg swelling (mild edema in the lower extremities - wears compression stockings intermittently. This has been present for about 15 years or so, and she did have work-up for blood clots). Negative for chest pain and palpitations.        Chest wall pain noted for about 5 years   Gastrointestinal: Negative for constipation, diarrhea, nausea and vomiting.   Genitourinary: Negative for dysuria.   Musculoskeletal: Positive for joint pain (knees from arthritis). Negative for myalgias.   Skin: Negative for itching and rash.        Mild erythema noted to patient's forehead. Waxes and wanes and more so when she swims in a public pool.    Neurological: Negative for dizziness and headaches.   Psychiatric/Behavioral: Negative for depression. The patient has insomnia. The patient is not nervous/anxious.           Objective:     /66   Pulse 64   Temp 36.3 °C (97.3 °F)   Resp 16   Ht 1.651 m (5' 5\")   Wt 90.2 kg (198 lb 11.9 oz)   SpO2 95%   Breastfeeding? No   BMI 33.07 kg/m²      Physical Exam   Constitutional: She is oriented to person, place, and time. She appears well-developed and well-nourished. No distress.   HENT:   Head: Normocephalic and atraumatic.   Mouth/Throat: Oropharynx is clear and moist. No oropharyngeal exudate.   Eyes: Conjunctivae and EOM are normal. Pupils are equal, round, and reactive to light. Right eye exhibits no discharge. Left eye exhibits no " discharge. No scleral icterus.   Neck: Normal range of motion. Neck supple. No thyromegaly present.   Cardiovascular: Normal rate, regular rhythm, normal heart sounds and intact distal pulses.  Exam reveals no gallop and no friction rub.    No murmur heard.  Pulmonary/Chest: Effort normal and breath sounds normal. No respiratory distress. She has no wheezes.   Abdominal: Soft. Bowel sounds are normal. She exhibits no distension. There is no tenderness.   Musculoskeletal: Normal range of motion. She exhibits no edema or tenderness.   Lymphadenopathy:        Head (right side): No submental, no submandibular, no tonsillar, no preauricular, no posterior auricular and no occipital adenopathy present.        Head (left side): No submental, no submandibular, no tonsillar, no preauricular, no posterior auricular and no occipital adenopathy present.     She has no cervical adenopathy.        Right cervical: No superficial cervical, no deep cervical and no posterior cervical adenopathy present.       Left cervical: No superficial cervical, no deep cervical and no posterior cervical adenopathy present.        Right: No supraclavicular adenopathy present.        Left: No supraclavicular adenopathy present.   Neurological: She is alert and oriented to person, place, and time.   Skin: Skin is warm and dry. No rash noted. She is not diaphoretic. No erythema. No pallor.   Psychiatric: She has a normal mood and affect. Her behavior is normal.   Vitals reviewed.      Ct-lung Cancer-screening    Result Date: 3/29/2018  3/29/2018 11:59 AM HISTORY/REASON FOR EXAM:  Screening for lung cancer. TECHNIQUE/EXAM DESCRIPTION AND NUMBER OF VIEWS: Lung cancer screening without contrast. Low dose noncontrast helical images were obtained of the chest from the lung apices through the costophrenic sulci utilizing thin collimation and intervals with reconstructed images sent to PACS in axial, coronal and sagittal planes. Low dose optimization  technique was utilized for this CT exam including automated exposure control and adjustment of the mA and/or kV according to patient size. COMPARISON: None. FINDINGS: 1 cm mass with lobulated borders is identified posteriorly in the left upper pulmonary lobe on image 77. [place] shaped nodule measuring 5.8 mm abuts the pleural surface laterally in the right lower pulmonary lobe on image 107. Calcified granuloma is noted posteriorly in the left lower pulmonary lobe. No mediastinal or hilar adenopathy is noted. No axillary adenopathy is identified. No consolidations are noted. No pleural effusions are identified. The adrenal glands are within normal limits by CT. There is mild calcific atherosclerosis.     1.  1 cm mass identified posteriorly in left lower pulmonary lobe with lobulated margins. Recommend PET/CT or follow-up CT in 3 months. 2.  Additional 5.8 mm nodule abuts the pleural surface at the lateral edge of the right lower pulmonary lobe. 3.  Minimal calcific atherosclerosis. 4.  No adenopathy identified. Lung RADS: 4A - Suspicious: Findings for which additional diagnostic testing and/or tissue sampling is recommended Findings: solid nodule(s) greater than or equal to 8 to less than 15 mm at baseline OR growing less than 8 mm OR new 6 to less than 8 mm part solid nodule(s): greater than or equal to 6 mm with solid component greater than or equal to 6 mm to less than 8 mm OR with a new or growing less than 4 mm solid component endobrachial nodule Management: 3 month LDCT; PET/CT may be used when there is a greater than 8 mm solid component         Assessment/Plan:     1. Lung nodule  [id number] BASE TO MID-THIGH     Plan  1. Patient does have a suspicious lung cancer screening CT with a 10 mm left lower lobe pulmonary nodule that is lobulated in appearance. I will start by proceeding with a PET scan at this time for further evaluation of the lung nodule. I did discuss the diet prep with the patient for  the PET/CT. Patient did verbalize understanding of the plan and is in agreement to proceed. I will see patient back in the clinic after the PET/CT is completed to discuss further plan of care.    Please note that this dictation was created using voice recognition software. I have made every reasonable attempt to correct obvious errors, but I expect that there are errors of grammar and possibly content that I did not discover before finalizing the note.

## 2018-04-17 ENCOUNTER — OFFICE VISIT (OUTPATIENT)
Dept: HEMATOLOGY ONCOLOGY | Facility: MEDICAL CENTER | Age: 68
End: 2018-04-17
Payer: MEDICARE

## 2018-04-17 ENCOUNTER — HOSPITAL ENCOUNTER (OUTPATIENT)
Dept: RADIOLOGY | Facility: MEDICAL CENTER | Age: 68
End: 2018-04-17
Attending: NURSE PRACTITIONER
Payer: MEDICARE

## 2018-04-17 VITALS
DIASTOLIC BLOOD PRESSURE: 70 MMHG | HEIGHT: 65 IN | OXYGEN SATURATION: 95 % | RESPIRATION RATE: 16 BRPM | WEIGHT: 196.87 LBS | SYSTOLIC BLOOD PRESSURE: 128 MMHG | TEMPERATURE: 97.8 F | BODY MASS INDEX: 32.8 KG/M2 | HEART RATE: 73 BPM

## 2018-04-17 DIAGNOSIS — R91.1 LUNG NODULE: ICD-10-CM

## 2018-04-17 PROCEDURE — A9552 F18 FDG: HCPCS

## 2018-04-17 PROCEDURE — 99212 OFFICE O/P EST SF 10 MIN: CPT | Performed by: NURSE PRACTITIONER

## 2018-04-17 ASSESSMENT — ENCOUNTER SYMPTOMS
WHEEZING: 0
SHORTNESS OF BREATH: 0
COUGH: 0

## 2018-04-17 ASSESSMENT — PAIN SCALES - GENERAL: PAINLEVEL: NO PAIN

## 2018-04-17 NOTE — PROGRESS NOTES
"Subjective:      Mojgan Austin is a 67 y.o. female who presents for Follow-Up for PET CT results.         HPI    Patient seen today in follow-up for PET CT results. She presents unaccompanied for today's visit. Patient stated she tolerated the PET/CT well and is anxious for the results.    Patient did have a 10.2 mm left upper lobe nodule seen on lung cancer screening CT. PET/CT shows focal increased uptake in the left upper lobe nodule with an SUV of 2.61. According to the reading radiologist this is consistent with neoplasm. There is no evidence to indicate any distant metastatic disease seen on PET/CT. I personally reviewed the films in detail as well as reviewed with the patient today.    Patient stated she is feeling well and no changes and how she is feeling since seen last in the office. She denies any coughing, wheezing or shortness of breath at this time.    Allergies   Allergen Reactions   • Betadine [Povidone Iodine] Contact Dermatitis   • Sulfa Drugs Rash     Current Outpatient Prescriptions on File Prior to Visit   Medication Sig Dispense Refill   • aspirin 81 MG tablet Take 81 mg by mouth every day.     • Multiple Vitamin (MULTI VITAMIN PO) Take  by mouth.     • hydrochlorothiazide (HYDRODIURIL) 25 MG Tab Take 1 Tab by mouth every day. 90 Tab 3   • levothyroxine (SYNTHROID) 200 MCG Tab Take 1 Tab by mouth every day. 90 Tab 3   • benzonatate (TESSALON) 200 MG capsule Take 1 Cap by mouth 3 times a day as needed for Cough. 30 Cap 0   • ranitidine (ZANTAC) 150 MG Tab Take 1 Tab by mouth 2 times a day. 60 Tab 11     No current facility-administered medications on file prior to visit.        Review of Systems   Respiratory: Negative for cough, shortness of breath and wheezing.           Objective:     /70   Pulse 73   Temp 36.6 °C (97.8 °F)   Resp 16   Ht 1.651 m (5' 5\")   Wt 89.3 kg (196 lb 13.9 oz)   SpO2 95%   Breastfeeding? No   BMI 32.76 kg/m²      Physical Exam "   Constitutional: She is oriented to person, place, and time. She appears well-developed and well-nourished. No distress.   Cardiovascular: Normal rate.    Pulmonary/Chest: Effort normal and breath sounds normal. No respiratory distress. She has no wheezes.   Musculoskeletal: Normal range of motion.   Neurological: She is alert and oriented to person, place, and time.   Skin: Skin is warm and dry. She is not diaphoretic.   Vitals reviewed.      Ov-arndd-bigol Base To Mid-thigh    Result Date: 4/17/2018 4/17/2018 8:46 AM HISTORY/REASON FOR EXAM:  Lung nodule noted on CT lung cancer screening LLL measuring 10.2 mm TECHNIQUE/EXAM DESCRIPTION AND NUMBER OF VIEWS: PET body imaging. Initially, 13.55 mCi F-18 FDG was administered intravenously under standardized conditions. Approximately 45 minutes after FDG administration, the patient was placed in the supine position on the PET CT table. Blood glucose level was 80 mg/dL. Low dose spiral CT imaging was performed from the skull base to the mid thighs. PET imaging was then performed from the skull base to the mid thighs. CT images, PET images, and PET/CT fused images were reviewed on a PACS 3D workstation. The limited non-contrast CT data are used primarily for attenuation correction and anatomic correlation.  Evaluation of solid organs and bowel are especially limited utilizing this technique. COMPARISON: CT chest 3/29/2019 FINDINGS: No abnormal activity in the neck. Mild increased uptake in the RIGHT thyroid lobe, likely physiologic. No abnormal activity in the mediastinum or RIGHT lung.  Focal increased activity in the LEFT upper lung corresponding to small ill-defined LEFT upper lobe nodule, max SUV 2.61. No abnormal activity in the liver or spleen. Expected urinary activity. Physiologic bowel uptake. Low-dose CT images show motion artifact at the skull base and upper neck.  Small nodule again seen in the posterior LEFT upper lobe measuring approximately 7 mm.   Probable LEFT kidney cyst.  Small gallstones noted.     1.  Focal increased uptake corresponding to small LEFT upper lobe nodule, most consistent with neoplasm. 2.  No evidence to indicate distant metastatic disease.         Assessment/Plan:     1. Lung nodule  REFERRAL TO GENERAL SURGERY    AMB PULMONARY FUNCTION TEST/LAB     Plan  1. Patient with a left upper lobe nodule that measures 10.2 mm in size seen on lung cancer screening CT. PET/CT shows focal increased uptake corresponding to the small left upper lobe nodule consistent with neoplasm. There is no other evidence of distant metastatic disease noted on PET/CT. I discussed the results with patient and I will plan to refer her to thoracic surgery for consultation. I will also order an schedule pulmonary function tests in anticipation for possible surgical intervention in the future. Patient verbalized understanding to the plan and is in agreement at this time.    There is no further follow-up with the IOC needed.

## 2018-04-20 ENCOUNTER — TELEPHONE (OUTPATIENT)
Dept: HEMATOLOGY ONCOLOGY | Facility: MEDICAL CENTER | Age: 68
End: 2018-04-20

## 2018-04-20 NOTE — TELEPHONE ENCOUNTER
I spoke with Yulissa at Dr. Ganser's office to ask if Dr. Ganser would be okay seeing the patient before having her PFT's done as I cannot get the patient in any sooner than scheduled. Yulissa spoke with the nurse for Dr. Ganser and she stated since Stacey and Dr. Ganser have spoken he is okay seeing her before the PFT's are done.     I left a message for the patient to return our call regarding the above information.

## 2018-04-20 NOTE — TELEPHONE ENCOUNTER
Patient called back stated that she had message  call from Calos ALEJANDRE I just relay the message from Dr Ganser office regarding the PFT

## 2018-04-20 NOTE — TELEPHONE ENCOUNTER
I left a message for Mojgan to return my call regarding her PFT's being scheduled.     Patient is scheduled on 5/7/18 checking in at 9:45 am.   Location: [facility] [address] [place] [address] (backside of the Er)  Instructions: No caffeine 4 hours prior, no smoking, no alcohol, and no use of inhalers unless it is an emergency.    Please give patient this information once she returns our call.    Patient is also scheduled to see Dr. Ganser on 5/1/18.

## 2018-04-20 NOTE — TELEPHONE ENCOUNTER
Patient called and stated that she would need her pulmonary function appointment moved to a sooner date. She has her appointment with her surgeon on 5/3/18 and per KAREN Gould she should have her pulmonary function test done prior to seeing her surgeon.

## 2018-05-07 ENCOUNTER — HOSPITAL ENCOUNTER (OUTPATIENT)
Dept: PULMONOLOGY | Facility: MEDICAL CENTER | Age: 68
End: 2018-05-07
Attending: NURSE PRACTITIONER
Payer: MEDICARE

## 2018-05-07 PROCEDURE — 94726 PLETHYSMOGRAPHY LUNG VOLUMES: CPT

## 2018-05-07 PROCEDURE — 94060 EVALUATION OF WHEEZING: CPT

## 2018-05-07 PROCEDURE — 94060 EVALUATION OF WHEEZING: CPT | Mod: 26 | Performed by: INTERNAL MEDICINE

## 2018-05-07 PROCEDURE — 94729 DIFFUSING CAPACITY: CPT

## 2018-05-07 PROCEDURE — 94729 DIFFUSING CAPACITY: CPT | Mod: 26 | Performed by: INTERNAL MEDICINE

## 2018-05-07 PROCEDURE — 94726 PLETHYSMOGRAPHY LUNG VOLUMES: CPT | Mod: 26 | Performed by: INTERNAL MEDICINE

## 2018-05-12 NOTE — PROCEDURES
DATE OF TEST:  05/07/2018    SPIROMETRY:  1.  FVC was 2.74 liters, 88% of predicted.  2.  FEV1 was 2.17 liters, 90% of predicted.  3.  FEV1/FVC ratio was 79%.  4.  There was good response to bronchodilators with FEV1 increased by 12%.  5.  Flow volume loop had normal shape.    LUNG VOLUMES:  1.  Total lung capacity was 5.23 liters, 100% of predicted.  2.  Residual volume was 2.52 liters, 116% of predicted.    Diffusion capacity was slightly increased at 127% of predicted.    IMPRESSION:  The patient had borderline obstructive ventilatory defect   improved and normalized after bronchodilators.  These findings are suggestive   of reactive airway disease like asthma.  Clinical correlation is required.       ____________________________________     MD ANDREW Barton / SHAJI    DD:  05/12/2018 13:50:35  DT:  05/12/2018 13:58:37    D#:  [id number]  Job#:  [id number]

## 2018-05-15 DIAGNOSIS — Z01.812 PRE-OPERATIVE LABORATORY EXAMINATION: ICD-10-CM

## 2018-05-15 DIAGNOSIS — Z01.810 PRE-OPERATIVE CARDIOVASCULAR EXAMINATION: ICD-10-CM

## 2018-05-15 LAB
ABO GROUP BLD: NORMAL
ALBUMIN SERPL BCP-MCNC: 4.1 G/DL (ref 3.2–4.9)
ALBUMIN/GLOB SERPL: 1.5 G/DL
ALP SERPL-CCNC: 79 U/L (ref 30–99)
ALT SERPL-CCNC: 15 U/L (ref 2–50)
ANION GAP SERPL CALC-SCNC: 8 MMOL/L (ref 0–11.9)
AST SERPL-CCNC: 15 U/L (ref 12–45)
BASOPHILS # BLD AUTO: 1.1 % (ref 0–1.8)
BASOPHILS # BLD: 0.06 K/UL (ref 0–0.12)
BILIRUB SERPL-MCNC: 0.4 MG/DL (ref 0.1–1.5)
BLD GP AB SCN SERPL QL: NORMAL
BUN SERPL-MCNC: 24 MG/DL (ref 8–22)
CALCIUM SERPL-MCNC: 9.7 MG/DL (ref 8.5–10.5)
CHLORIDE SERPL-SCNC: 107 MMOL/L (ref 96–112)
CO2 SERPL-SCNC: 25 MMOL/L (ref 20–33)
CREAT SERPL-MCNC: 0.69 MG/DL (ref 0.5–1.4)
EKG IMPRESSION: NORMAL
EOSINOPHIL # BLD AUTO: 0.09 K/UL (ref 0–0.51)
EOSINOPHIL NFR BLD: 1.7 % (ref 0–6.9)
ERYTHROCYTE [DISTWIDTH] IN BLOOD BY AUTOMATED COUNT: 43.9 FL (ref 35.9–50)
GLOBULIN SER CALC-MCNC: 2.7 G/DL (ref 1.9–3.5)
GLUCOSE SERPL-MCNC: 79 MG/DL (ref 65–99)
HCT VFR BLD AUTO: 43.8 % (ref 37–47)
HGB BLD-MCNC: 13.8 G/DL (ref 12–16)
IMM GRANULOCYTES # BLD AUTO: 0.01 K/UL (ref 0–0.11)
IMM GRANULOCYTES NFR BLD AUTO: 0.2 % (ref 0–0.9)
LYMPHOCYTES # BLD AUTO: 1.81 K/UL (ref 1–4.8)
LYMPHOCYTES NFR BLD: 34.5 % (ref 22–41)
MCH RBC QN AUTO: 29.3 PG (ref 27–33)
MCHC RBC AUTO-ENTMCNC: 31.5 G/DL (ref 33.6–35)
MCV RBC AUTO: 93 FL (ref 81.4–97.8)
MONOCYTES # BLD AUTO: 0.39 K/UL (ref 0–0.85)
MONOCYTES NFR BLD AUTO: 7.4 % (ref 0–13.4)
NEUTROPHILS # BLD AUTO: 2.88 K/UL (ref 2–7.15)
NEUTROPHILS NFR BLD: 55.1 % (ref 44–72)
NRBC # BLD AUTO: 0 K/UL
NRBC BLD-RTO: 0 /100 WBC
PLATELET # BLD AUTO: 259 K/UL (ref 164–446)
PMV BLD AUTO: 10 FL (ref 9–12.9)
POTASSIUM SERPL-SCNC: 3.7 MMOL/L (ref 3.6–5.5)
PROT SERPL-MCNC: 6.8 G/DL (ref 6–8.2)
RBC # BLD AUTO: 4.71 M/UL (ref 4.2–5.4)
RH BLD: NORMAL
SODIUM SERPL-SCNC: 140 MMOL/L (ref 135–145)
WBC # BLD AUTO: 5.2 K/UL (ref 4.8–10.8)

## 2018-05-15 PROCEDURE — 85025 COMPLETE CBC W/AUTO DIFF WBC: CPT

## 2018-05-15 PROCEDURE — 80053 COMPREHEN METABOLIC PANEL: CPT

## 2018-05-15 PROCEDURE — 36415 COLL VENOUS BLD VENIPUNCTURE: CPT

## 2018-05-15 PROCEDURE — 86850 RBC ANTIBODY SCREEN: CPT

## 2018-05-15 PROCEDURE — 93010 ELECTROCARDIOGRAM REPORT: CPT | Performed by: INTERNAL MEDICINE

## 2018-05-15 PROCEDURE — 86900 BLOOD TYPING SEROLOGIC ABO: CPT

## 2018-05-15 PROCEDURE — 93005 ELECTROCARDIOGRAM TRACING: CPT

## 2018-05-15 PROCEDURE — 86901 BLOOD TYPING SEROLOGIC RH(D): CPT

## 2018-06-05 ENCOUNTER — APPOINTMENT (OUTPATIENT)
Dept: RADIOLOGY | Facility: MEDICAL CENTER | Age: 68
DRG: 165 | End: 2018-06-05
Attending: SURGERY
Payer: MEDICARE

## 2018-06-05 ENCOUNTER — HOSPITAL ENCOUNTER (INPATIENT)
Facility: MEDICAL CENTER | Age: 68
LOS: 2 days | DRG: 165 | End: 2018-06-07
Attending: SURGERY | Admitting: SURGERY
Payer: MEDICARE

## 2018-06-05 DIAGNOSIS — G89.18 POSTOPERATIVE PAIN: ICD-10-CM

## 2018-06-05 LAB
ABO GROUP BLD: NORMAL
RH BLD: NORMAL

## 2018-06-05 PROCEDURE — 700111 HCHG RX REV CODE 636 W/ 250 OVERRIDE (IP)

## 2018-06-05 PROCEDURE — 160009 HCHG ANES TIME/MIN: Performed by: SURGERY

## 2018-06-05 PROCEDURE — 88331 PATH CONSLTJ SURG 1 BLK 1SPC: CPT

## 2018-06-05 PROCEDURE — 500385 HCHG DRAIN, PLEUROVAC ADUL: Performed by: SURGERY

## 2018-06-05 PROCEDURE — 94760 N-INVAS EAR/PLS OXIMETRY 1: CPT

## 2018-06-05 PROCEDURE — 160048 HCHG OR STATISTICAL LEVEL 1-5: Performed by: SURGERY

## 2018-06-05 PROCEDURE — 160041 HCHG SURGERY MINUTES - EA ADDL 1 MIN LEVEL 4: Performed by: SURGERY

## 2018-06-05 PROCEDURE — A6402 STERILE GAUZE <= 16 SQ IN: HCPCS | Performed by: SURGERY

## 2018-06-05 PROCEDURE — 501445 HCHG STAPLER, SKIN DISP: Performed by: SURGERY

## 2018-06-05 PROCEDURE — 36415 COLL VENOUS BLD VENIPUNCTURE: CPT

## 2018-06-05 PROCEDURE — 500312 HCHG CONNECTOR, BLAKE DRAIN 1-WAY: Performed by: SURGERY

## 2018-06-05 PROCEDURE — 501581 HCHG TROCAR: Performed by: SURGERY

## 2018-06-05 PROCEDURE — 88312 SPECIAL STAINS GROUP 1: CPT

## 2018-06-05 PROCEDURE — 700101 HCHG RX REV CODE 250

## 2018-06-05 PROCEDURE — 160029 HCHG SURGERY MINUTES - 1ST 30 MINS LEVEL 4: Performed by: SURGERY

## 2018-06-05 PROCEDURE — 700101 HCHG RX REV CODE 250: Performed by: SURGERY

## 2018-06-05 PROCEDURE — 501583 HCHG TROCAR, THRD CAN&SEAL 5X100: Performed by: SURGERY

## 2018-06-05 PROCEDURE — 700105 HCHG RX REV CODE 258: Performed by: PHYSICIAN ASSISTANT

## 2018-06-05 PROCEDURE — A9270 NON-COVERED ITEM OR SERVICE: HCPCS | Performed by: PHYSICIAN ASSISTANT

## 2018-06-05 PROCEDURE — 160035 HCHG PACU - 1ST 60 MINS PHASE I: Performed by: SURGERY

## 2018-06-05 PROCEDURE — 501574 HCHG TROCAR, SMTH CAN&SEAL 5: Performed by: SURGERY

## 2018-06-05 PROCEDURE — 501463 HCHG STAPLES, UNIV. ROTIC: Performed by: SURGERY

## 2018-06-05 PROCEDURE — 160002 HCHG RECOVERY MINUTES (STAT): Performed by: SURGERY

## 2018-06-05 PROCEDURE — 501838 HCHG SUTURE GENERAL: Performed by: SURGERY

## 2018-06-05 PROCEDURE — 700102 HCHG RX REV CODE 250 W/ 637 OVERRIDE(OP): Performed by: PHYSICIAN ASSISTANT

## 2018-06-05 PROCEDURE — 501570 HCHG TROCAR, SEPARATOR: Performed by: SURGERY

## 2018-06-05 PROCEDURE — 502571 HCHG PACK, LAP CHOLE: Performed by: SURGERY

## 2018-06-05 PROCEDURE — 500122 HCHG BOVIE, BLADE: Performed by: SURGERY

## 2018-06-05 PROCEDURE — A6404 STERILE GAUZE > 48 SQ IN: HCPCS | Performed by: SURGERY

## 2018-06-05 PROCEDURE — 0BBG4ZZ EXCISION OF LEFT UPPER LUNG LOBE, PERCUTANEOUS ENDOSCOPIC APPROACH: ICD-10-PCS | Performed by: SURGERY

## 2018-06-05 PROCEDURE — 71045 X-RAY EXAM CHEST 1 VIEW: CPT

## 2018-06-05 PROCEDURE — 160036 HCHG PACU - EA ADDL 30 MINS PHASE I: Performed by: SURGERY

## 2018-06-05 PROCEDURE — 500378 HCHG DRAIN, J-VAC ROUND 19FR: Performed by: SURGERY

## 2018-06-05 PROCEDURE — 88307 TISSUE EXAM BY PATHOLOGIST: CPT

## 2018-06-05 PROCEDURE — 700111 HCHG RX REV CODE 636 W/ 250 OVERRIDE (IP): Performed by: PHYSICIAN ASSISTANT

## 2018-06-05 PROCEDURE — 770006 HCHG ROOM/CARE - MED/SURG/GYN SEMI*

## 2018-06-05 RX ORDER — PROMETHAZINE HYDROCHLORIDE 25 MG/1
25 SUPPOSITORY RECTAL EVERY 6 HOURS PRN
Status: DISCONTINUED | OUTPATIENT
Start: 2018-06-05 | End: 2018-06-07 | Stop reason: HOSPADM

## 2018-06-05 RX ORDER — SODIUM CHLORIDE, SODIUM LACTATE, POTASSIUM CHLORIDE, CALCIUM CHLORIDE 600; 310; 30; 20 MG/100ML; MG/100ML; MG/100ML; MG/100ML
1000 INJECTION, SOLUTION INTRAVENOUS CONTINUOUS
Status: DISCONTINUED | OUTPATIENT
Start: 2018-06-05 | End: 2018-06-06

## 2018-06-05 RX ORDER — GLYCOPYRROLATE 0.2 MG/ML
0.2 INJECTION INTRAMUSCULAR; INTRAVENOUS ONCE
Status: COMPLETED | OUTPATIENT
Start: 2018-06-05 | End: 2018-06-05

## 2018-06-05 RX ORDER — SODIUM CHLORIDE, SODIUM LACTATE, POTASSIUM CHLORIDE, CALCIUM CHLORIDE 600; 310; 30; 20 MG/100ML; MG/100ML; MG/100ML; MG/100ML
INJECTION, SOLUTION INTRAVENOUS CONTINUOUS
Status: DISCONTINUED | OUTPATIENT
Start: 2018-06-05 | End: 2018-06-05

## 2018-06-05 RX ORDER — DIPHENHYDRAMINE HYDROCHLORIDE 50 MG/ML
25 INJECTION INTRAMUSCULAR; INTRAVENOUS EVERY 6 HOURS PRN
Status: DISCONTINUED | OUTPATIENT
Start: 2018-06-05 | End: 2018-06-07 | Stop reason: HOSPADM

## 2018-06-05 RX ORDER — LORAZEPAM 2 MG/ML
.5-1 INJECTION INTRAMUSCULAR EVERY 4 HOURS PRN
Status: DISCONTINUED | OUTPATIENT
Start: 2018-06-05 | End: 2018-06-07 | Stop reason: HOSPADM

## 2018-06-05 RX ORDER — LIDOCAINE HYDROCHLORIDE 10 MG/ML
0.5 INJECTION, SOLUTION INFILTRATION; PERINEURAL
Status: DISPENSED | OUTPATIENT
Start: 2018-06-05 | End: 2018-06-06

## 2018-06-05 RX ORDER — HYDRALAZINE HYDROCHLORIDE 20 MG/ML
10 INJECTION INTRAMUSCULAR; INTRAVENOUS EVERY 6 HOURS PRN
Status: DISCONTINUED | OUTPATIENT
Start: 2018-06-05 | End: 2018-06-07 | Stop reason: HOSPADM

## 2018-06-05 RX ORDER — ONDANSETRON 2 MG/ML
4 INJECTION INTRAMUSCULAR; INTRAVENOUS EVERY 4 HOURS PRN
Status: DISCONTINUED | OUTPATIENT
Start: 2018-06-05 | End: 2018-06-07 | Stop reason: HOSPADM

## 2018-06-05 RX ORDER — ONDANSETRON 4 MG/1
4 TABLET, ORALLY DISINTEGRATING ORAL EVERY 4 HOURS PRN
Status: DISCONTINUED | OUTPATIENT
Start: 2018-06-05 | End: 2018-06-07 | Stop reason: HOSPADM

## 2018-06-05 RX ORDER — ENALAPRILAT 1.25 MG/ML
2.5 INJECTION INTRAVENOUS EVERY 6 HOURS PRN
Status: DISCONTINUED | OUTPATIENT
Start: 2018-06-05 | End: 2018-06-07 | Stop reason: HOSPADM

## 2018-06-05 RX ORDER — LEVOTHYROXINE SODIUM 0.2 MG/1
200 TABLET ORAL DAILY
Status: DISCONTINUED | OUTPATIENT
Start: 2018-06-06 | End: 2018-06-07 | Stop reason: HOSPADM

## 2018-06-05 RX ORDER — GLYCOPYRROLATE 0.2 MG/ML
INJECTION INTRAMUSCULAR; INTRAVENOUS
Status: COMPLETED
Start: 2018-06-05 | End: 2018-06-05

## 2018-06-05 RX ORDER — KETOROLAC TROMETHAMINE 30 MG/ML
15 INJECTION, SOLUTION INTRAMUSCULAR; INTRAVENOUS EVERY 6 HOURS
Status: DISCONTINUED | OUTPATIENT
Start: 2018-06-05 | End: 2018-06-07 | Stop reason: HOSPADM

## 2018-06-05 RX ORDER — DIPHENHYDRAMINE HCL 25 MG
25 TABLET ORAL EVERY 6 HOURS PRN
Status: DISCONTINUED | OUTPATIENT
Start: 2018-06-05 | End: 2018-06-07 | Stop reason: HOSPADM

## 2018-06-05 RX ORDER — FAMOTIDINE 20 MG/1
20 TABLET, FILM COATED ORAL 2 TIMES DAILY
Status: DISCONTINUED | OUTPATIENT
Start: 2018-06-05 | End: 2018-06-07 | Stop reason: HOSPADM

## 2018-06-05 RX ORDER — MORPHINE SULFATE 10 MG/ML
1-5 INJECTION, SOLUTION INTRAMUSCULAR; INTRAVENOUS
Status: DISCONTINUED | OUTPATIENT
Start: 2018-06-05 | End: 2018-06-07 | Stop reason: HOSPADM

## 2018-06-05 RX ORDER — LIDOCAINE HYDROCHLORIDE 10 MG/ML
INJECTION, SOLUTION EPIDURAL; INFILTRATION; INTRACAUDAL; PERINEURAL
Status: COMPLETED
Start: 2018-06-05 | End: 2018-06-05

## 2018-06-05 RX ORDER — HYDROCODONE BITARTRATE AND ACETAMINOPHEN 5; 325 MG/1; MG/1
1-2 TABLET ORAL EVERY 4 HOURS PRN
Status: DISCONTINUED | OUTPATIENT
Start: 2018-06-05 | End: 2018-06-07 | Stop reason: HOSPADM

## 2018-06-05 RX ORDER — BUPIVACAINE HYDROCHLORIDE AND EPINEPHRINE 5; 5 MG/ML; UG/ML
INJECTION, SOLUTION EPIDURAL; INTRACAUDAL; PERINEURAL
Status: DISCONTINUED | OUTPATIENT
Start: 2018-06-05 | End: 2018-06-05 | Stop reason: HOSPADM

## 2018-06-05 RX ADMIN — HYDROMORPHONE HYDROCHLORIDE 0.5 MG: 10 INJECTION, SOLUTION INTRAMUSCULAR; INTRAVENOUS; SUBCUTANEOUS at 15:30

## 2018-06-05 RX ADMIN — MORPHINE SULFATE 4 MG: 10 INJECTION INTRAVENOUS at 21:20

## 2018-06-05 RX ADMIN — FENTANYL CITRATE 50 MCG: 50 INJECTION, SOLUTION INTRAMUSCULAR; INTRAVENOUS at 15:25

## 2018-06-05 RX ADMIN — GLYCOPYRROLATE 0.2 MG: 0.2 INJECTION INTRAMUSCULAR; INTRAVENOUS at 16:15

## 2018-06-05 RX ADMIN — LIDOCAINE HYDROCHLORIDE 0.5 ML: 10 INJECTION, SOLUTION EPIDURAL; INFILTRATION; INTRACAUDAL; PERINEURAL at 12:00

## 2018-06-05 RX ADMIN — LIDOCAINE HYDROCHLORIDE 0.5 ML: 10 INJECTION, SOLUTION INFILTRATION; PERINEURAL at 12:00

## 2018-06-05 RX ADMIN — SODIUM CHLORIDE, SODIUM LACTATE, POTASSIUM CHLORIDE, CALCIUM CHLORIDE 10 ML: 600; 310; 30; 20 INJECTION, SOLUTION INTRAVENOUS at 12:00

## 2018-06-05 RX ADMIN — FENTANYL CITRATE 50 MCG: 50 INJECTION, SOLUTION INTRAMUSCULAR; INTRAVENOUS at 15:17

## 2018-06-05 RX ADMIN — FAMOTIDINE 20 MG: 20 TABLET ORAL at 20:07

## 2018-06-05 RX ADMIN — BENZOCAINE AND MENTHOL 1 LOZENGE: 15; 3.6 LOZENGE ORAL at 21:31

## 2018-06-05 RX ADMIN — SODIUM CHLORIDE, POTASSIUM CHLORIDE, SODIUM LACTATE AND CALCIUM CHLORIDE 1000 ML: 600; 310; 30; 20 INJECTION, SOLUTION INTRAVENOUS at 20:15

## 2018-06-05 RX ADMIN — MORPHINE SULFATE 2 MG: 10 INJECTION INTRAVENOUS at 20:07

## 2018-06-05 RX ADMIN — KETOROLAC TROMETHAMINE 15 MG: 30 INJECTION, SOLUTION INTRAMUSCULAR at 21:20

## 2018-06-05 ASSESSMENT — LIFESTYLE VARIABLES
TOTAL SCORE: 0
TOTAL SCORE: 0
EVER HAD A DRINK FIRST THING IN THE MORNING TO STEADY YOUR NERVES TO GET RID OF A HANGOVER: NO
EVER FELT BAD OR GUILTY ABOUT YOUR DRINKING: NO
ON A TYPICAL DAY WHEN YOU DRINK ALCOHOL HOW MANY DRINKS DO YOU HAVE: 1
AVERAGE NUMBER OF DAYS PER WEEK YOU HAVE A DRINK CONTAINING ALCOHOL: 1
EVER_SMOKED: YES
ALCOHOL_USE: YES
EVER_SMOKED: YES
HAVE PEOPLE ANNOYED YOU BY CRITICIZING YOUR DRINKING: NO
HOW MANY TIMES IN THE PAST YEAR HAVE YOU HAD 5 OR MORE DRINKS IN A DAY: 0
HAVE YOU EVER FELT YOU SHOULD CUT DOWN ON YOUR DRINKING: NO
CONSUMPTION TOTAL: NEGATIVE
TOTAL SCORE: 0

## 2018-06-05 ASSESSMENT — PAIN SCALES - GENERAL
PAINLEVEL_OUTOF10: 5
PAINLEVEL_OUTOF10: 5
PAINLEVEL_OUTOF10: 4
PAINLEVEL_OUTOF10: 5
PAINLEVEL_OUTOF10: 4
PAINLEVEL_OUTOF10: 8
PAINLEVEL_OUTOF10: 5
PAINLEVEL_OUTOF10: 4
PAINLEVEL_OUTOF10: 8
PAINLEVEL_OUTOF10: 4
PAINLEVEL_OUTOF10: 5
PAINLEVEL_OUTOF10: 8
PAINLEVEL_OUTOF10: 5
PAINLEVEL_OUTOF10: 4

## 2018-06-05 ASSESSMENT — PATIENT HEALTH QUESTIONNAIRE - PHQ9
1. LITTLE INTEREST OR PLEASURE IN DOING THINGS: NOT AT ALL
1. LITTLE INTEREST OR PLEASURE IN DOING THINGS: NOT AT ALL
SUM OF ALL RESPONSES TO PHQ9 QUESTIONS 1 AND 2: 0
SUM OF ALL RESPONSES TO PHQ9 QUESTIONS 1 AND 2: 0
2. FEELING DOWN, DEPRESSED, IRRITABLE, OR HOPELESS: NOT AT ALL
2. FEELING DOWN, DEPRESSED, IRRITABLE, OR HOPELESS: NOT AT ALL

## 2018-06-05 ASSESSMENT — COPD QUESTIONNAIRES
COPD SCREENING SCORE: 6
HAVE YOU SMOKED AT LEAST 100 CIGARETTES IN YOUR ENTIRE LIFE: YES
DURING THE PAST 4 WEEKS HOW MUCH DID YOU FEEL SHORT OF BREATH: NONE/LITTLE OF THE TIME
DO YOU EVER COUGH UP ANY MUCUS OR PHLEGM?: YES, A FEW DAYS A WEEK OR MONTH

## 2018-06-05 NOTE — OR SURGEON
Immediate Post OP Note    PreOp Diagnosis: Left upper lobe lung mass    PostOp Diagnosis: Same    Procedure(s):  LEFT THORACOSCOPY-  WEDGE RESECTION UPPER LOBE - Wound Class: Clean    Surgeon(s):  John H Ganser, M.D.    Anesthesiologist/Type of Anesthesia:  Anesthesiologist: Adryan Hawk M.D./General    Surgical Staff:  Circulator: Terry Amezcua R.N.; Ruben Otto R.N.  Scrub Person: Maicol Moore Assist: MAI Tirado    Specimens removed if any:  * No specimens in log *    Estimated Blood Loss: 0    Findings: Chronic inflammation    Complications: 0        6/5/2018 2:51 PM John H Ganser, M.D.

## 2018-06-05 NOTE — PROGRESS NOTES
The Medication Reconciliation process has been completed by interviewing the patient    Allergies have been reviewed  Antibiotic use in 30 days - none    Home Pharmacy:  [facility] [place]

## 2018-06-05 NOTE — OR NURSING
Patient awake s/p thorocoscopy.  A+OX3.  Restless and frightened. Reassured. Given meds as ordered.  Rt chest dressing to chest tube clean and dry.  Atrium intact and to suction. resp spont and reg  Sats 100% on 3l/nc. resp not labored.

## 2018-06-06 LAB
ANION GAP SERPL CALC-SCNC: 7 MMOL/L (ref 0–11.9)
BUN SERPL-MCNC: 13 MG/DL (ref 8–22)
CALCIUM SERPL-MCNC: 8.4 MG/DL (ref 8.5–10.5)
CHLORIDE SERPL-SCNC: 107 MMOL/L (ref 96–112)
CO2 SERPL-SCNC: 26 MMOL/L (ref 20–33)
CREAT SERPL-MCNC: 0.51 MG/DL (ref 0.5–1.4)
ERYTHROCYTE [DISTWIDTH] IN BLOOD BY AUTOMATED COUNT: 41.2 FL (ref 35.9–50)
GLUCOSE SERPL-MCNC: 127 MG/DL (ref 65–99)
HCT VFR BLD AUTO: 34.2 % (ref 37–47)
HGB BLD-MCNC: 11.3 G/DL (ref 12–16)
MCH RBC QN AUTO: 29.5 PG (ref 27–33)
MCHC RBC AUTO-ENTMCNC: 33 G/DL (ref 33.6–35)
MCV RBC AUTO: 89.3 FL (ref 81.4–97.8)
PLATELET # BLD AUTO: 241 K/UL (ref 164–446)
PMV BLD AUTO: 10.1 FL (ref 9–12.9)
POTASSIUM SERPL-SCNC: 3.7 MMOL/L (ref 3.6–5.5)
RBC # BLD AUTO: 3.83 M/UL (ref 4.2–5.4)
SODIUM SERPL-SCNC: 140 MMOL/L (ref 135–145)
WBC # BLD AUTO: 8.9 K/UL (ref 4.8–10.8)

## 2018-06-06 PROCEDURE — 36415 COLL VENOUS BLD VENIPUNCTURE: CPT

## 2018-06-06 PROCEDURE — 85027 COMPLETE CBC AUTOMATED: CPT

## 2018-06-06 PROCEDURE — 700102 HCHG RX REV CODE 250 W/ 637 OVERRIDE(OP): Performed by: PHYSICIAN ASSISTANT

## 2018-06-06 PROCEDURE — 770006 HCHG ROOM/CARE - MED/SURG/GYN SEMI*

## 2018-06-06 PROCEDURE — 80048 BASIC METABOLIC PNL TOTAL CA: CPT

## 2018-06-06 PROCEDURE — A9270 NON-COVERED ITEM OR SERVICE: HCPCS | Performed by: PHYSICIAN ASSISTANT

## 2018-06-06 PROCEDURE — 700111 HCHG RX REV CODE 636 W/ 250 OVERRIDE (IP): Performed by: PHYSICIAN ASSISTANT

## 2018-06-06 RX ORDER — HYDROCODONE BITARTRATE AND ACETAMINOPHEN 5; 325 MG/1; MG/1
1-2 TABLET ORAL EVERY 6 HOURS PRN
Qty: 20 TAB | Refills: 0 | Status: SHIPPED | OUTPATIENT
Start: 2018-06-06 | End: 2018-06-09

## 2018-06-06 RX ADMIN — FAMOTIDINE 20 MG: 20 TABLET ORAL at 09:17

## 2018-06-06 RX ADMIN — KETOROLAC TROMETHAMINE 15 MG: 30 INJECTION, SOLUTION INTRAMUSCULAR at 20:57

## 2018-06-06 RX ADMIN — LEVOTHYROXINE SODIUM 200 MCG: 200 TABLET ORAL at 11:00

## 2018-06-06 RX ADMIN — KETOROLAC TROMETHAMINE 15 MG: 30 INJECTION, SOLUTION INTRAMUSCULAR at 15:19

## 2018-06-06 RX ADMIN — HYDROCODONE BITARTRATE AND ACETAMINOPHEN 1 TABLET: 5; 325 TABLET ORAL at 20:57

## 2018-06-06 RX ADMIN — KETOROLAC TROMETHAMINE 15 MG: 30 INJECTION, SOLUTION INTRAMUSCULAR at 02:46

## 2018-06-06 RX ADMIN — KETOROLAC TROMETHAMINE 15 MG: 30 INJECTION, SOLUTION INTRAMUSCULAR at 09:17

## 2018-06-06 RX ADMIN — HYDROCODONE BITARTRATE AND ACETAMINOPHEN 2 TABLET: 5; 325 TABLET ORAL at 02:46

## 2018-06-06 RX ADMIN — ENOXAPARIN SODIUM 40 MG: 100 INJECTION SUBCUTANEOUS at 09:17

## 2018-06-06 RX ADMIN — MORPHINE SULFATE 4 MG: 10 INJECTION INTRAVENOUS at 04:45

## 2018-06-06 RX ADMIN — FAMOTIDINE 20 MG: 20 TABLET ORAL at 20:57

## 2018-06-06 ASSESSMENT — PAIN SCALES - GENERAL
PAINLEVEL_OUTOF10: 4
PAINLEVEL_OUTOF10: 4
PAINLEVEL_OUTOF10: 6
PAINLEVEL_OUTOF10: 4
PAINLEVEL_OUTOF10: 2
PAINLEVEL_OUTOF10: 0
PAINLEVEL_OUTOF10: 3
PAINLEVEL_OUTOF10: 8
PAINLEVEL_OUTOF10: 3
PAINLEVEL_OUTOF10: 1

## 2018-06-06 ASSESSMENT — ENCOUNTER SYMPTOMS
VOMITING: 0
NAUSEA: 0
FEVER: 0
SHORTNESS OF BREATH: 0
CHILLS: 0

## 2018-06-06 NOTE — PROGRESS NOTES
"Assumed care of patient from RN at 0700.     Reviewed VS, labs, orders, and notes.     Pt sitting up  in bed. A&Ox 4.    /63   Pulse 61   Temp 36.4 °C (97.5 °F)   Resp 16   Ht 1.676 m (5' 6\")   Wt 89.8 kg (197 lb 15.9 oz)   SpO2 93%   Breastfeeding? No   BMI 31.96 kg/m² . MEWS Score: 0.     On room air satting at 94%. Denies SOB.  750 on IS.    Moves all extremities, denies numbness or tingling.     Skin assessed over bony prominences and under medical devices.   Voiding, hyperactive BS, denies flatus, LBM PTA.    Regular diet, tolerating well, denies nausea/ vomiting.     Wound(s): Drain(s): Left CT to -20cm H2O suction. Dressing changed. .     Patient reports 3 /10 pain in left abdomen.    Pt. is SBA assist. steady gait.    Fall prevention education reinforced with pt. Pt is wearing treaded slipper socks,SL, , lower bedrails are down. Pt calls appropriatly.     Discussed POC, all questions answered at this time. Bed is locked and in the lowest position, call light within reach, Q 1 hour rounding in place. All needs met at this time.   "

## 2018-06-06 NOTE — PROGRESS NOTES
Surgical Progress Note    Author: Alex Tavares Date & Time created: 2018   3:56 PM     Interval Events:  S/p  Left thoracoscopy with wedge resection, left upper lobe lung mass - POD#1, doing well; pain controlled; ambulatory; using IS; minor po  Review of Systems   Constitutional: Negative for chills and fever.   Respiratory: Negative for shortness of breath.    Gastrointestinal: Negative for nausea and vomiting.   Skin: Negative for itching and rash.     Hemodynamics:  Temp (24hrs), Av.5 °C (97.7 °F), Min:36.1 °C (96.9 °F), Max:36.8 °C (98.2 °F)  Temperature: 36.7 °C (98 °F)  Pulse  Av.7  Min: 41  Max: 86Heart Rate (Monitored): (!) 47  Blood Pressure : 106/55, NIBP: 148/78     Respiratory:    Respiration: 17, Pulse Oximetry: 99 %, O2 Daily Delivery Respiratory : Silicone Nasal Cannula  Chest Tube Group Left-Tube Status / Drainage: Patent, Chest Tube Group Left-Device: Suction 20 cm Water;Closed Drainage System  Work Of Breathing / Effort: Mild  RUL Breath Sounds: Diminished, RML Breath Sounds: Diminished, RLL Breath Sounds: Diminished, ROBYN Breath Sounds: Diminished, LLL Breath Sounds: Diminished  Neuro:  GCS       Fluids:    Intake/Output Summary (Last 24 hours) at 18 1556  Last data filed at 18 1330   Gross per 24 hour   Intake             2160 ml   Output              548 ml   Net             1612 ml        Current Diet Order   Procedures   • Diet Order     Physical Exam   Constitutional: She is oriented to person, place, and time. No distress.   Cardiovascular: Normal rate.    Pulmonary/Chest: Effort normal. No respiratory distress.   Chest tube in place, ~ 100ml serosang output/24 hrs  No airleak seen  Wounds c/d/i  CXR : no ptx   Abdominal: Soft.   Neurological: She is alert and oriented to person, place, and time.   Skin: Skin is warm and dry.   Psychiatric: She has a normal mood and affect.   Nursing note and vitals reviewed.    Labs:  Recent Results (from the past 24 hour(s))   CBC  without Differential    Collection Time: 06/06/18  3:52 AM   Result Value Ref Range    WBC 8.9 4.8 - 10.8 K/uL    RBC 3.83 (L) 4.20 - 5.40 M/uL    Hemoglobin 11.3 (L) 12.0 - 16.0 g/dL    Hematocrit 34.2 (L) 37.0 - 47.0 %    MCV 89.3 81.4 - 97.8 fL    MCH 29.5 27.0 - 33.0 pg    MCHC 33.0 (L) 33.6 - 35.0 g/dL    RDW 41.2 35.9 - 50.0 fL    Platelet Count 241 164 - 446 K/uL    MPV 10.1 9.0 - 12.9 fL   Basic Metabolic Panel (BPM)    Collection Time: 06/06/18  3:52 AM   Result Value Ref Range    Sodium 140 135 - 145 mmol/L    Potassium 3.7 3.6 - 5.5 mmol/L    Chloride 107 96 - 112 mmol/L    Co2 26 20 - 33 mmol/L    Glucose 127 (H) 65 - 99 mg/dL    Bun 13 8 - 22 mg/dL    Creatinine 0.51 0.50 - 1.40 mg/dL    Calcium 8.4 (L) 8.5 - 10.5 mg/dL    Anion Gap 7.0 0.0 - 11.9   ESTIMATED GFR    Collection Time: 06/06/18  3:52 AM   Result Value Ref Range    GFR If African American >60 >60 mL/min/1.73 m 2    GFR If Non African American >60 >60 mL/min/1.73 m 2     Medical Decision Making, by Problem:  There are no active hospital problems to display for this patient.    Plan:  Chest tube discontinued, Tegaderm bandage placed  Discharge home  when alert, comfortable, ambulatory, and tolerating PO well.  Pt counseled re: diet , activity, wound care, I.S., and home med's  May shower POD #2 over Tegaderms  Remove Tegaderms on POD #4  No baths, hot tubs, soaks for 7 days  No lifting >20 lbs for 1 wk  No driving for 4-5 days   F/U with Dr. Ganser in 1-2 weeks    Quality Measures:  Quality-Core Measures   Reviewed items::  Labs reviewed, Medications reviewed and Radiology images reviewed  Chow catheter::  No Chow  DVT prophylaxis pharmacological::  Enoxaparin (Lovenox)  DVT prophylaxis - mechanical:  SCDs  Ulcer Prophylaxis::  Yes      Discussed patient condition with Family, RN, Patient and Dr. Ganser

## 2018-06-06 NOTE — OR NURSING
Patient incontinent of urine.  pericare done and bed changed.  VSS, report called to floor rn.  Awaiting room clean. Family updated with room assignment.

## 2018-06-06 NOTE — PROGRESS NOTES
Assumed care at 1900 received report from day shift RN.  DOMO&Ox4.    8/10 Pain medicated per MAR, denies nausea,   tolerating regular diet,   +voiding, + BM + bowel sounds   patient is worried about constipation from narcotics.  ,  Chest tube left side  To suction at 20  Incision site is clean dry and intact  Patient  ambulating to bathroom with stand by assist   shows mid 90's on 3L nasal canula  Patient is wearing SCD's      Patient call light within reach, bed in the lowest position, hourly rounding in place.  POC discussed

## 2018-06-06 NOTE — CARE PLAN
Problem: Pain Management  Goal: Pain level will decrease to patient's comfort goal  Outcome: PROGRESSING AS EXPECTED  Patient pain is well controlled with medication per MAR.  RN educated patient to splint her left side with a pillow when coughing to help with pain.

## 2018-06-06 NOTE — OP REPORT
DATE OF SERVICE:  06/05/2018    PREOPERATIVE DIAGNOSIS:  Left upper lobe lung mass.    POSTOPERATIVE DIAGNOSIS:  Chronic inflammatory nodule, left upper lobe.    PROCEDURE:  Left thoracoscopy with wedge resection, left upper lobe lung mass.    SURGEON:  John H. Ganser, MD    ASSISTANT:  Alex Tavares PA-C    ANESTHESIA:  General.    ANESTHESIOLOGIST:  Adryan Hawk MD    INDICATIONS:  Patient is a 67-year-old female with significant smoking history   who was found to have a left upper lobe lung nodule on screening CT scan.    There is some uptake on PET scan.  We discussed options.  Risks, benefits, and   alternatives to thoracoscopic wedge resection were outlined in detail.  All   questions answered and wished to proceed.    FINDINGS:  There was a firm nodule in the fissure of the right upper lobe that   was somewhat firm with no overlying puckering of the pleura.  Frozen section   confirmed chronic inflammatory changes, no evidence of malignancy.    DESCRIPTION OF PROCEDURE:  The patient was identified and general anesthetic   administered with a double-lumen endotracheal tube.  She was placed in the   right lateral decubitus position.  Left chest was prepped and draped in the   usual sterile fashion.  Local anesthesia of 0.5% Marcaine with epinephrine was   injected prior to making skin incision.  Small incision was made in the   midaxial line approximately 7th intercostal space and a 5 mm blunt trocar and   5 mm 30-degree scope inserted.  Anterior 5 and 12 mm trocars were placed.  The   above findings were noted.  This area was elevated and wedge resection   carried out with sequential firing of the [place] 45 powered stapler using   blue loads achieving grossly clear margins.  Specimen was placed in endoscopic   bag, withdrawn through the 12-mm trocar site.    Palpation confirmed the mass in the specimen with grossly clear margins.    Frozen section confirmed chronic inflammation and scarring with no  evidence of   malignancy.  The lung was reexpanded and no evidence of air leak.  A   19-[place] Brayden drain was passed through the midaxillary line incision, angled   towards the apex, and secured to skin with silk.  The other incisions were   closed with Vicryl.  Sterile dressings were applied.  Tube attached to a   pleural suction device.  The patient returned to recovery room in stable   condition.       ____________________________________     JOHN H. GANSER, MD [facility] / [facility]    DD:  06/05/2018 14:54:56  DT:  06/05/2018 17:14:58    D#:  [id number]  Job#:  [id number]    cc: ANASTASIA DOVER MD, Stacey CARLISLE

## 2018-06-06 NOTE — CARE PLAN
Problem: Safety  Goal: Will remain free from falls  Outcome: PROGRESSING AS EXPECTED  Pt verbalized understanding of fall education.     Problem: Pain Management  Goal: Pain level will decrease to patient's comfort goal  Outcome: PROGRESSING AS EXPECTED  Consistent pain scale used.

## 2018-06-06 NOTE — OR NURSING
Dr Hawk at bedside to reassess.  Patient hr 45-50.  VSS. Ok to send to floor.  Patient denies sob; dyspnea  And pain tolerable.  Awaiting room clean.

## 2018-06-07 VITALS
HEIGHT: 66 IN | OXYGEN SATURATION: 98 % | TEMPERATURE: 97.8 F | BODY MASS INDEX: 31.82 KG/M2 | HEART RATE: 51 BPM | DIASTOLIC BLOOD PRESSURE: 53 MMHG | WEIGHT: 198 LBS | RESPIRATION RATE: 17 BRPM | SYSTOLIC BLOOD PRESSURE: 121 MMHG

## 2018-06-07 PROCEDURE — 700102 HCHG RX REV CODE 250 W/ 637 OVERRIDE(OP): Performed by: PHYSICIAN ASSISTANT

## 2018-06-07 PROCEDURE — A9270 NON-COVERED ITEM OR SERVICE: HCPCS | Performed by: PHYSICIAN ASSISTANT

## 2018-06-07 PROCEDURE — 700111 HCHG RX REV CODE 636 W/ 250 OVERRIDE (IP): Performed by: PHYSICIAN ASSISTANT

## 2018-06-07 RX ADMIN — ENOXAPARIN SODIUM 40 MG: 100 INJECTION SUBCUTANEOUS at 10:16

## 2018-06-07 RX ADMIN — FAMOTIDINE 20 MG: 20 TABLET ORAL at 10:16

## 2018-06-07 RX ADMIN — KETOROLAC TROMETHAMINE 15 MG: 30 INJECTION, SOLUTION INTRAMUSCULAR at 10:16

## 2018-06-07 RX ADMIN — HYDROCODONE BITARTRATE AND ACETAMINOPHEN 1 TABLET: 5; 325 TABLET ORAL at 03:11

## 2018-06-07 RX ADMIN — LEVOTHYROXINE SODIUM 200 MCG: 200 TABLET ORAL at 10:16

## 2018-06-07 RX ADMIN — KETOROLAC TROMETHAMINE 15 MG: 30 INJECTION, SOLUTION INTRAMUSCULAR at 04:04

## 2018-06-07 ASSESSMENT — PAIN SCALES - GENERAL
PAINLEVEL_OUTOF10: 5
PAINLEVEL_OUTOF10: 1
PAINLEVEL_OUTOF10: 1

## 2018-06-07 NOTE — PROGRESS NOTES
Room air at rest 88%  70% ambulating on RA  96% ambulating on 2L  93% at rest on 1L    Due to O2 desaturation while ambulating, pt will spend 1 more night. Paged Alex HUBBARD to update.

## 2018-06-07 NOTE — CARE PLAN
Problem: Pain Management  Goal: Pain level will decrease to patient's comfort goal  Outcome: PROGRESSING AS EXPECTED  Patient taking norco 5mg prn for left chest discomfort to help improve oxygenation and expansion of the lungs    Problem: Respiratory:  Goal: Respiratory status will improve  Outcome: PROGRESSING SLOWER THAN EXPECTED  Patient using IS as encouraged to

## 2018-06-07 NOTE — PROGRESS NOTES
Patient's oxygen saturation improved. Resting on room air 95% Ambulating on room air 88%. Meets parameters verbalized by Alex HUBBARD to this RN. Patient discharged home. All lines removed. Armband removed. Discharge instructions and prescriptions reviewed and understanding verbalized. Patient states they will fill prescriptions. Patient states that they will return to emergency if discussed symptoms arise. Patient left via wheelchair and was escorted of property by staff. All personal items were removed from the room prior to departure. Medications from drawer removed and sent back to pharmacy or disposed of per protocol. Chart given to unit clerk .

## 2018-06-07 NOTE — PROGRESS NOTES
Received report from day shift RN and assumed care.  Patient is A+Ox4, very [place], pleasant and cooperative.  She is a standby assist to the bathroom and has extension tubing for her oxygen. She is on 1LNC.  Patient c/o 4/10 left chest discomfort where CT was.  Patient medicated with norco and educated that the pain medication will help her with her IS and help her lungs to expand better so she can be without oxygen.  Left CT dressings are CDI with some small old drainage.  She is on a regular diet. She is ready to get some rest.  Dr. Angelo is aware she is staying another night d/t desatting on RA.  No other concerns at this time.  Bed is locked and in lowest position, treaded socks on, call light and belongings within reach.  Hourly rounding in place.

## 2018-06-07 NOTE — PROGRESS NOTES
"Assumed care of patient from RN at 0700.      Reviewed VS, labs, orders, and notes.      Pt sitting up  in bed. A&Ox 4.     /53   Pulse (!) 51   Temp 36.6 °C (97.8 °F)   Resp 17   Ht 1.676 m (5' 6\")   Wt 89.8 kg (197 lb 15.9 oz)   SpO2 98%   Breastfeeding? No   BMI 31.96 kg/m² . MEWS Score: 1.     On room air satting at 94%. Denies SOB.  750 on IS.     Moves all extremities, denies numbness or tingling.      Skin assessed over bony prominences and under medical devices.   Voiding, hyperactive BS, + flatus, LBM PTA.     Regular diet, tolerating well, denies nausea/ vomiting.      Wound(s): Drain(s): Left CT to -20cm H2O suction. Dressing changed. .      Patient reports 1 /10 pain in left abdomen.     Pt. is SBA assist. steady gait.     Fall prevention education reinforced with pt. Pt is wearing treaded slipper socks,SL, lower bedrails are down. Pt calls appropriatly.      Discussed POC, all questions answered at this time. Bed is locked and in the lowest position, call light within reach, Q 1 hour rounding in place. All needs met at this time.   "

## 2018-06-07 NOTE — DISCHARGE INSTRUCTIONS
Discharge Instructions    Discharged to home by car with friend. Discharged via wheelchair, hospital escort: Yes.  Special equipment needed: Not Applicable    Be sure to schedule a follow-up appointment with your primary care doctor or any specialists as instructed.     Discharge Plan:   Diet Plan: Discussed  Activity Level: Discussed  Confirmed Follow up Appointment: Patient to Call and Schedule Appointment  Confirmed Symptoms Management: Discussed  Medication Reconciliation Updated: Yes  Influenza Vaccine Indication: Not indicated: Previously immunized this influenza season and > 8 years of age    I understand that a diet low in cholesterol, fat, and sodium is recommended for good health. Unless I have been given specific instructions below for another diet, I accept this instruction as my diet prescription.   Other diet: Regular    Special Instructions:     May shower POD #2 over Tegaderms   Remove Tegaderms on POD #4   No baths, hot tubs, soaks for 7 days   No lifting >20 lbs for 1 wk   No driving for 4-5 days   F/U with Dr. Ganser in 1-2 weeks    · Is patient discharged on Warfarin / Coumadin?   No       Thoracoscopy, Care After  Refer to this sheet in the next few weeks. These instructions provide you with information about caring for yourself after your procedure. Your health care provider may also give you more specific instructions. Your treatment has been planned according to current medical practices, but problems sometimes occur. Call your health care provider if you have any problems or questions after your procedure.  WHAT TO EXPECT AFTER THE PROCEDURE:  After your procedure, it is common to feel sore for up to two weeks.  HOME CARE INSTRUCTIONS  · There are many different ways to close and cover an incision, including stitches (sutures), skin glue, and adhesive strips. Follow your health care provider's instructions about:  ¨ Incision care.  ¨ Bandage (dressing) changes and removal.  ¨ Incision  closure removal.  · Check your incision area every day for signs of infection. Watch for:  ¨ Redness, swelling, or pain.  ¨ Fluid, blood, or pus.  · Take medicines only as directed by your health care provider.  · Try to cough often. Coughing helps to protect against lung infection (pneumonia). It may hurt to cough. If this happens, hold a pillow against your chest when you cough.  · Take deep breaths. This also helps to protect against pneumonia.  · If you were given an incentive spirometer, use it as directed by your health care provider.  · Do not take baths, swim, or use a hot tub until your health care provider approves. You may take showers.  · Avoid lifting until your health care provider approves.  · Avoid driving until your health care provider approves.  · Do not travel by airplane after the chest tube is removed until your health care provider approves.  SEEK MEDICAL CARE IF:  · You have a fever.  · Pain medicines do not ease your pain.  · You have redness, swelling, or increasing pain in your incision area.  · You develop a cough that does not go away, or you are coughing up mucus that is yellow or green.  SEEK IMMEDIATE MEDICAL CARE IF:  · You have fluid, blood, or pus coming from your incision.  · There is a bad smell coming from your incision or dressing.  · You develop a rash.  · You have difficulty breathing.  · You cough up blood.  · You develop light-headedness or you feel faint.  · You develop chest pain.  · Your heartbeat feels irregular or very fast.     This information is not intended to replace advice given to you by your health care provider. Make sure you discuss any questions you have with your health care provider.     Document Released: 07/07/2006 Document Revised: 01/08/2016 Document Reviewed: 09/02/2015  [employer] Patient Education ©2016 [employer]        Depression / Suicide Risk    As you are discharged from this [facility] facility, it is important to learn how to  keep safe from harming yourself.    Recognize the warning signs:  · Abrupt changes in personality, positive or negative- including increase in energy   · Giving away possessions  · Change in eating patterns- significant weight changes-  positive or negative  · Change in sleeping patterns- unable to sleep or sleeping all the time   · Unwillingness or inability to communicate  · Depression  · Unusual sadness, discouragement and loneliness  · Talk of wanting to die  · Neglect of personal appearance   · Rebelliousness- reckless behavior  · Withdrawal from people/activities they love  · Confusion- inability to concentrate     If you or a loved one observes any of these behaviors or has concerns about self-harm, here's what you can do:  · Talk about it- your feelings and reasons for harming yourself  · Remove any means that you might use to hurt yourself (examples: pills, rope, extension cords, firearm)  · Get professional help from the community (Mental Health, Substance Abuse, psychological counseling)  · Do not be alone:Call your Safe Contact- someone whom you trust who will be there for you.  · Call your local CRISIS HOTLINE 588-1966 or 912-595-7837  · Call your local Children's Mobile Crisis Response Team [place] (115) 378-3767 or www.[facility]  · Call the toll free National Suicide Prevention Hotlines   · National Suicide Prevention Lifeline 176-788-PITU (6627)  · National Hope Line Network 800-SUICIDE (774-9374)

## 2018-06-07 NOTE — PROGRESS NOTES
"Progress Note:    S: Doing better today  Up in chair using IS    O:  Recent Labs      06/06/18   0352   WBC  8.9   RBC  3.83*   HEMOGLOBIN  11.3*   HEMATOCRIT  34.2*   MCV  89.3   MCH  29.5   MCHC  33.0*   RDW  41.2   PLATELETCT  241   MPV  10.1     Recent Labs      06/06/18   0352   SODIUM  140   POTASSIUM  3.7   CHLORIDE  107   CO2  26   GLUCOSE  127*   BUN  13   CREATININE  0.51   CALCIUM  8.4*         Current Facility-Administered Medications   Medication Dose   • levothyroxine (SYNTHROID) tablet 200 mcg  200 mcg   • enoxaparin (LOVENOX) inj 40 mg  40 mg   • Pharmacy Consult Request ...Pain Management Review 1 Each  1 Each   • ondansetron (ZOFRAN) syringe/vial injection 4 mg  4 mg   • diphenhydrAMINE (BENADRYL) injection 25 mg  25 mg   • diphenhydrAMINE (BENADRYL) tablet/capsule 25 mg  25 mg    Or   • diphenhydrAMINE (BENADRYL) injection 25 mg  25 mg   • benzocaine-menthol (CEPACOL) lozenge 1 Lozenge  1 Lozenge   • Respiratory Care per Protocol     • ketorolac (TORADOL) injection 15 mg  15 mg   • enalaprilat (VASOTEC) injection 2.5 mg  2.5 mg   • famotidine (PEPCID) tablet 20 mg  20 mg    Or   • famotidine (PEPCID) injection 20 mg  20 mg   • hydrALAZINE (APRESOLINE) injection 10 mg  10 mg   • HYDROcodone-acetaminophen (NORCO) 5-325 MG per tablet 1-2 Tab  1-2 Tab   • LORazepam (ATIVAN) injection 0.5-1 mg  0.5-1 mg   • morphine (pf) 10 mg/ml 10 MG/ML injection 1-5 mg  1-5 mg   • ondansetron (ZOFRAN ODT) dispertab 4 mg  4 mg   • promethazine (PHENERGAN) suppository 25 mg  25 mg       PE:  Blood pressure 131/67, pulse (!) 51, temperature 36.7 °C (98.1 °F), resp. rate 17, height 1.676 m (5' 6\"), weight 89.8 kg (197 lb 15.9 oz), SpO2 98 %, not currently breastfeeding.    Intake/Output Summary (Last 24 hours) at 06/07/18 0808  Last data filed at 06/07/18 0345   Gross per 24 hour   Intake              880 ml   Output               18 ml   Net              862 ml       Chest clear  Wounds dry    Rads:  DX-CHEST-LIMITED " (1 VIEW)   Final Result      1.  There is a left apical chest tube in place without a visible pneumothorax.   2.  Enlarged cardiac silhouette with prominent vessels suggesting vascular congestion.          A: There are no active hospital problems to display for this patient.        P: Discharge  Maintaining O2 sats on RA    John Ganser M.D.  [facility]

## 2018-06-13 ENCOUNTER — OFFICE VISIT (OUTPATIENT)
Dept: URGENT CARE | Facility: PHYSICIAN GROUP | Age: 68
End: 2018-06-13
Payer: MEDICARE

## 2018-06-13 ENCOUNTER — APPOINTMENT (OUTPATIENT)
Dept: RADIOLOGY | Facility: IMAGING CENTER | Age: 68
End: 2018-06-13
Attending: FAMILY MEDICINE
Payer: MEDICARE

## 2018-06-13 VITALS
HEART RATE: 80 BPM | HEIGHT: 66 IN | SYSTOLIC BLOOD PRESSURE: 140 MMHG | DIASTOLIC BLOOD PRESSURE: 80 MMHG | RESPIRATION RATE: 18 BRPM | WEIGHT: 193.8 LBS | TEMPERATURE: 98 F | BODY MASS INDEX: 31.15 KG/M2 | OXYGEN SATURATION: 97 %

## 2018-06-13 DIAGNOSIS — R14.0 ABDOMINAL BLOATING: ICD-10-CM

## 2018-06-13 DIAGNOSIS — K59.00 CONSTIPATION, UNSPECIFIED CONSTIPATION TYPE: ICD-10-CM

## 2018-06-13 DIAGNOSIS — R10.9 LEFT SIDED ABDOMINAL PAIN: ICD-10-CM

## 2018-06-13 PROCEDURE — 99214 OFFICE O/P EST MOD 30 MIN: CPT | Performed by: FAMILY MEDICINE

## 2018-06-13 PROCEDURE — 74019 RADEX ABDOMEN 2 VIEWS: CPT | Mod: TC,FY | Performed by: FAMILY MEDICINE

## 2018-06-13 RX ORDER — LACTULOSE 10 G/15ML
SOLUTION ORAL
Qty: 600 ML | Refills: 0 | Status: SHIPPED | OUTPATIENT
Start: 2018-06-13 | End: 2018-07-16

## 2018-06-13 NOTE — PROGRESS NOTES
Chief Complaint:    Chief Complaint   Patient presents with   • Abdominal Pain     LUQ pain x3d   • Vomiting     x1day       History of Present Illness:    This is a new problem. For 3 days, she is having left-sided mid-upper abdominal pain. Feels like there is a protrusion in this area too that she can notice when she stands up. She had 2 episodes of vomiting today. No diarrhea. Had recent left thoracoscopy with wedge resection, left upper lobe lung mass on 6/5/18. She was rx'd Hydrocodone-APAP 5-325 mg to take which did give her some constipation issues. She has stopped med and bowels are getting more back to normal.      Review of Systems:    Constitutional: Negative for fever, chills, and diaphoresis.   Eyes: Negative for change in vision, photophobia, pain, redness, and discharge.  ENT: Negative for ear pain, ear discharge, hearing loss, tinnitus, nasal congestion, nosebleeds, and sore throat.    Respiratory: Negative for cough, hemoptysis, sputum production, shortness of breath, wheezing, and stridor.    Cardiovascular: Negative for chest pain, palpitations, orthopnea, claudication, leg swelling, and PND.   Gastrointestinal: See HPI.  Genitourinary: Negative for dysuria, urinary urgency, urinary frequency, hematuria, and flank pain.   Musculoskeletal: Left-sided rib pain due to recent left thoracoscopy.   Skin: Negative for rash and itching.   Neurological: Negative for dizziness, tingling, tremors, sensory change, speech change, focal weakness, seizures, loss of consciousness, and headaches.   Endo: Hypothyroid, on medication.   Heme: Does not bruise/bleed easily.   Psychiatric/Behavioral: Negative for depression, suicidal ideas, hallucinations, memory loss and substance abuse.       Past Medical History:    Past Medical History:   Diagnosis Date   • Arthritis     OA   • Hepatitis B 1970   • HTN (hypertension)    • Hypothyroid    • Insomnia    • Jaundice 1970   • Unspecified cataract     bilateral IOL      Past Surgical History:    Past Surgical History:   Procedure Laterality Date   • THORACOSCOPY Left 6/5/2018    Procedure: THORACOSCOPY-  WEDGE RESECTION UPPER LOBE;  Surgeon: John H Ganser, M.D.;  Location: SURGERY [facility];  Service: General   • CATARACT PHACO WITH IOL  3/17/2015    Performed by Gabriel Manning M.D. at [facility]   • GYN SURGERY      C Section     Social History:    Social History     Social History   • Marital status: Single     Spouse name: N/A   • Number of children: 3   • Years of education: N/A     Occupational History   • Not on file.     Social History Main Topics   • Smoking status: Former Smoker     Packs/day: 2.00     Years: 40.00     Types: Cigarettes     Quit date: 12/5/2007   • Smokeless tobacco: Never Used      Comment: 60 pack years   • Alcohol use Yes      Comment: 1 per week    • Drug use: Yes      Comment: edible marijuana 0-1 per week   • Sexual activity: No     Other Topics Concern   • Not on file     Social History Narrative   • No narrative on file     Family History:    Family History   Problem Relation Age of Onset   • Cancer Mother      Lung cancer   • Cancer Maternal Uncle 50     Lung cancer   • Cancer Maternal Grandmother      Lung cancer   • Cancer Maternal Grandfather      Lung cancer     Medications:    Current Outpatient Prescriptions on File Prior to Visit   Medication Sig Dispense Refill   • aspirin 81 MG tablet Take 81 mg by mouth every day.     • Multiple Vitamin (MULTI VITAMIN PO) Take 1 Tab by mouth every day.     • hydrochlorothiazide (HYDRODIURIL) 25 MG Tab Take 1 Tab by mouth every day. 90 Tab 3   • levothyroxine (SYNTHROID) 200 MCG Tab Take 1 Tab by mouth every day. 90 Tab 3     No current facility-administered medications on file prior to visit.      Allergies:    Allergies   Allergen Reactions   • Betadine [Povidone Iodine] Contact Dermatitis   • Sulfa Drugs Rash     Rxn - unsure what med she took to have this reaction  She has  "tolerated other meds with sulfa in them.        Vitals:    Vitals:    06/13/18 1525   BP: 140/80   Pulse: 80   Resp: 18   Temp: 36.7 °C (98 °F)   SpO2: 97%   Weight: 87.9 kg (193 lb 12.8 oz)   Height: 1.676 m (5' 6\")       Physical Exam:    Constitutional: Vital signs reviewed. Appears well-developed and well-nourished. No acute distress.   Eyes: Sclera white, conjunctivae clear.   ENT: External ears normal.   Neck: Neck supple.   Pulmonary/Chest: Respirations non-labored.  Abdomen: Mildly tender to palpation left mid-upper abdomen. Bowel sounds are hypoactive. Soft, non-distended, and otherwise non-tender to palpation.  Musculoskeletal: Normal gait. No muscular atrophy or weakness.  Neurological: Alert and oriented to person, place, and time. Muscle tone normal. Coordination normal.   Skin: No rashes or lesions. Warm, dry, normal turgor.  Psychiatric: Normal mood and affect. Behavior is normal. Judgment and thought content normal.     Diagnostics:    [id number] VIEWS (Order #[id number]) on 6/13/18   Narrative       6/13/2018 4:05 PM    HISTORY/REASON FOR EXAM:  Left-sided abdominal pain for 3 days.      TECHNIQUE/EXAM DESCRIPTION AND NUMBER OF VIEWS:  2 supine and upright view(s) of the abdomen.    COMPARISON: None    FINDINGS:  No free air is present.  No small bowel dilatation is present.  There is a slightly increased volume of stool within the proximal colon.  No bowel wall edema is identified.   Impression       1.  Slightly increased volume of stool in the proximal colon. This may represent minor constipation.    2.  No evidence of small bowel obstruction or free air.     I personally reviewed the images. Images and Rad report reviewed with her.      Assessment / Plan:    1. Left sided abdominal pain  - [id number] VIEWS; Future  - lactulose 10 GM/15ML Solution; 30 ML BY MOUTH UP TO TWICE A DAY ONLY IF NEEDED FOR ABDOMINAL BLOATING, ABDOMINAL PAIN, OR CONSTIPATION.  Dispense: 600 mL; Refill: 0    2. " Abdominal bloating  - lactulose 10 GM/15ML Solution; 30 ML BY MOUTH UP TO TWICE A DAY ONLY IF NEEDED FOR ABDOMINAL BLOATING, ABDOMINAL PAIN, OR CONSTIPATION.  Dispense: 600 mL; Refill: 0    3. Constipation, unspecified constipation type  - lactulose 10 GM/15ML Solution; 30 ML BY MOUTH UP TO TWICE A DAY ONLY IF NEEDED FOR ABDOMINAL BLOATING, ABDOMINAL PAIN, OR CONSTIPATION.  Dispense: 600 mL; Refill: 0      Discussed with her DDX and management options.    I reviewed the x-ray images with her. She has a large amount of stool right lower abdomen, but she is not tender to palpation there.     She has medium amount of gas in the colon in the left mid-upper abdomen that outlines the colon well.     This may be causing her discomfort in this region and her feeling that there is occl a protrusion that she notices in this area. She does not have an acute abdomen on exam.    Agreeable to medication prescribed.    Advised if getting worse or abdominal pain is persistently intense, she should go to Emergency Room for evaluation as CT scan evaluation (we do not have here) may be needed.    May return to urgent care if needed.

## 2018-07-04 NOTE — DISCHARGE SUMMARY
DATE OF ADMISSION:  06/05/2018    DATE OF DISCHARGE:  06/07/2018    DISCHARGE DIAGNOSIS:  Left upper lobe lung nodule showing chronic   inflammation.    PROCEDURES:  On 06/05/2018, thoracoscopic wedge resection of left upper lobe   lung nodule.    HOSPITAL COURSE:  Patient is a 68-year-old female with significant smoking   history, was found to have a left upper lobe lung nodule on screening CT scan.    There was some uptake on PET.  Given these findings, she underwent   thoracoscopic wedge resection.  Final pathology showed chronic inflammation.    Postoperatively, she did well.  She had chest tube overnight and this was   removed on the first postoperative day.  At the time of discharge on the   second postoperative day, she is tolerating a diet, pain is controlled with   oral medications and wounds are healing well without sign of infection.    CONDITION ON DISCHARGE:  Stable.    FOLLOWUP:  Follow up with Dr. Ganser in 1 week.    DISCHARGE MEDICATIONS:  Norco p.r.n. pain.       ____________________________________     JOHN H. GANSER, MD [facility] / [facility]    DD:  07/03/2018 14:34:33  DT:  07/03/2018 23:53:11    D#:  [id number]  Job#:  [id number]

## 2018-07-05 ENCOUNTER — TELEPHONE (OUTPATIENT)
Dept: MEDICAL GROUP | Facility: PHYSICIAN GROUP | Age: 68
End: 2018-07-05

## 2018-07-16 ENCOUNTER — OFFICE VISIT (OUTPATIENT)
Dept: MEDICAL GROUP | Facility: PHYSICIAN GROUP | Age: 68
End: 2018-07-16
Payer: MEDICARE

## 2018-07-16 VITALS
BODY MASS INDEX: 32.3 KG/M2 | SYSTOLIC BLOOD PRESSURE: 108 MMHG | OXYGEN SATURATION: 95 % | HEART RATE: 82 BPM | RESPIRATION RATE: 16 BRPM | DIASTOLIC BLOOD PRESSURE: 64 MMHG | WEIGHT: 201 LBS | TEMPERATURE: 98.1 F | HEIGHT: 66 IN

## 2018-07-16 DIAGNOSIS — R10.12 LEFT UPPER QUADRANT PAIN: ICD-10-CM

## 2018-07-16 PROCEDURE — 99214 OFFICE O/P EST MOD 30 MIN: CPT | Performed by: NURSE PRACTITIONER

## 2018-07-16 NOTE — ASSESSMENT & PLAN NOTE
Patient continues to struggle with left upper quadrant tenderness with palpation, she is status post left lung wedge resection due to a nodule.  Ever since her surgery she has had tenderness and a mild burning sensation as well as a sensation of fullness in the affected area.  She was recently seen in urgent care which abdominal x-ray showed only increased volume of stool indicating constipation.  She was treated with lactulose, she states this improved her constipation, but she still has that sensation in her left upper quadrant.  It is very mildly tender with palpation to the affected area.  It has not worsened, the pain has stayed about the same since her procedure in early June.  I did discuss with her that this is likely still related to her surgery and that she is likely aggravating it by certain movements such as lifting, pushing or pulling.  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  I advised her to continue with supportive measures including rest when able, heat or ice, over-the-counter analgesics avoiding aggravating movements.  She was advised if symptoms do not improve over the next couple weeks or if they worsen she is to follow-up with her PCP as well as her surgeon.

## 2018-07-16 NOTE — PATIENT INSTRUCTIONS
Can continue with supportive measures, Heat or Ice, rest--but not continued, try otc analgesics.    Come back if not better or if you get worse

## 2018-07-21 DIAGNOSIS — I10 ESSENTIAL HYPERTENSION: ICD-10-CM

## 2018-07-23 RX ORDER — HYDROCHLOROTHIAZIDE 25 MG/1
TABLET ORAL
Qty: 90 TAB | Refills: 1 | Status: SHIPPED | OUTPATIENT
Start: 2018-07-23 | End: 2018-12-31 | Stop reason: SDUPTHER

## 2018-07-23 NOTE — TELEPHONE ENCOUNTER
Was the patient seen in the last year in this department? Yes     Does patient have an active prescription for medications requested? No     Received Request Via: Pharmacy    Pt met protocol?: Yes     Last OV 07/2018  BP Readings from Last 1 Encounters:   07/16/18 108/64

## 2018-07-23 NOTE — TELEPHONE ENCOUNTER
Refill X 6 months, sent to pharmacy.Pt. Seen in the last 6 months per protocol.   Lab Results   Component Value Date/Time    SODIUM 140 06/06/2018 03:52 AM    POTASSIUM 3.7 06/06/2018 03:52 AM    CHLORIDE 107 06/06/2018 03:52 AM    CO2 26 06/06/2018 03:52 AM    GLUCOSE 127 (H) 06/06/2018 03:52 AM    BUN 13 06/06/2018 03:52 AM    CREATININE 0.51 06/06/2018 03:52 AM

## 2018-09-19 DIAGNOSIS — E03.9 HYPOTHYROIDISM, UNSPECIFIED TYPE: ICD-10-CM

## 2018-09-20 NOTE — TELEPHONE ENCOUNTER
Was the patient seen in the last year in this department? Yes    Does patient have an active prescription for medications requested? No     Received Request Via: Pharmacy    Pt met protocol?: Yes     Last OV 07/2018  TSH   Date Value Ref Range Status   10/15/2014 1.030 0.300 - 3.700 uIU/mL Final

## 2018-09-21 DIAGNOSIS — E03.9 HYPOTHYROIDISM, UNSPECIFIED TYPE: ICD-10-CM

## 2018-09-21 RX ORDER — LEVOTHYROXINE SODIUM 0.2 MG/1
TABLET ORAL
Refills: 3 | OUTPATIENT
Start: 2018-09-21

## 2018-09-21 RX ORDER — LEVOTHYROXINE SODIUM 0.2 MG/1
TABLET ORAL
Qty: 90 TAB | Refills: 0 | Status: SHIPPED | OUTPATIENT
Start: 2018-09-21 | End: 2018-10-27 | Stop reason: SDUPTHER

## 2018-10-27 DIAGNOSIS — E03.9 HYPOTHYROIDISM, UNSPECIFIED TYPE: ICD-10-CM

## 2018-10-30 RX ORDER — LEVOTHYROXINE SODIUM 0.2 MG/1
TABLET ORAL
Qty: 30 TAB | Refills: 0 | Status: SHIPPED | OUTPATIENT
Start: 2018-10-30 | End: 2019-02-05 | Stop reason: SDUPTHER

## 2018-10-30 NOTE — TELEPHONE ENCOUNTER
Please remind pt to get labs done., next request will be denied. Will only send 1 month to pharmacy.

## 2018-10-30 NOTE — TELEPHONE ENCOUNTER
Was the patient seen in the last year in this department? Yes    Does patient have an active prescription for medications requested? No     Received Request Via: Pharmacy      Pt met protocol?: No due for labs pt last ov 7/2018   TSH   Date Value Ref Range Status   10/15/2014 1.030 0.300 - 3.700 uIU/mL Final     No recent outside labs

## 2018-12-12 ENCOUNTER — OFFICE VISIT (OUTPATIENT)
Dept: MEDICAL GROUP | Facility: PHYSICIAN GROUP | Age: 68
End: 2018-12-12
Payer: MEDICARE

## 2018-12-12 VITALS
SYSTOLIC BLOOD PRESSURE: 116 MMHG | BODY MASS INDEX: 31.82 KG/M2 | DIASTOLIC BLOOD PRESSURE: 72 MMHG | RESPIRATION RATE: 16 BRPM | HEART RATE: 76 BPM | OXYGEN SATURATION: 97 % | HEIGHT: 66 IN | TEMPERATURE: 98.3 F | WEIGHT: 198 LBS

## 2018-12-12 DIAGNOSIS — R73.01 ELEVATED FASTING BLOOD SUGAR: ICD-10-CM

## 2018-12-12 DIAGNOSIS — Z12.39 BREAST CANCER SCREENING: ICD-10-CM

## 2018-12-12 DIAGNOSIS — E03.9 HYPOTHYROIDISM, UNSPECIFIED TYPE: ICD-10-CM

## 2018-12-12 DIAGNOSIS — R10.12 LEFT UPPER QUADRANT PAIN: ICD-10-CM

## 2018-12-12 DIAGNOSIS — D64.9 ANEMIA, UNSPECIFIED TYPE: ICD-10-CM

## 2018-12-12 DIAGNOSIS — E78.5 DYSLIPIDEMIA: ICD-10-CM

## 2018-12-12 DIAGNOSIS — F51.01 PRIMARY INSOMNIA: ICD-10-CM

## 2018-12-12 DIAGNOSIS — I10 ESSENTIAL HYPERTENSION: ICD-10-CM

## 2018-12-12 PROBLEM — G47.00 INSOMNIA: Status: ACTIVE | Noted: 2018-12-12

## 2018-12-12 PROCEDURE — 99214 OFFICE O/P EST MOD 30 MIN: CPT | Performed by: FAMILY MEDICINE

## 2018-12-12 NOTE — ASSESSMENT & PLAN NOTE
Due for recheck on labs.   Results for MARIANA DARBY (MRN [id number]) as of 12/12/2018 09:55   Ref. Range 10/15/2014 10:02   Cholesterol,Tot Latest Ref Range: 100 - 199 mg/dL 222 (H)   Triglycerides Latest Ref Range: 0 - 149 mg/dL 156 (H)   HDL Latest Ref Range: >=40 mg/dL 44   LDL Latest Ref Range: <100 mg/dL 147 (H)   Advised on healthy diet.

## 2018-12-12 NOTE — ASSESSMENT & PLAN NOTE
Takes occasional edible marijuana to help with sleep.   Does not need this every day. Sometimes she gets into a pattern where she can't sleep and only takes the edible to break the cycle.

## 2018-12-12 NOTE — ASSESSMENT & PLAN NOTE
Ever since her left lung wedge resection (benign lung nodule), she has tenderness under her ribs on the left side.   Not severe enough to need tylenol. Her main concern is that it looks slightly swollen in the area.

## 2018-12-12 NOTE — PROGRESS NOTES
Subjective:   Mariana Austin is a 68 y.o. female here today for evaluation and management of:     Hypothyroid  Chronic condition, stable on levothyroxine 200mcg  Due for labs.     Insomnia  Takes occasional edible marijuana to help with sleep.   Does not need this every day. Sometimes she gets into a pattern where she can't sleep and only takes the edible to break the cycle.     Dyslipidemia  Due for recheck on labs.   Results for MARIANA AUSTIN (MRN [id number]) as of 12/12/2018 09:55   Ref. Range 10/15/2014 10:02   Cholesterol,Tot Latest Ref Range: 100 - 199 mg/dL 222 (H)   Triglycerides Latest Ref Range: 0 - 149 mg/dL 156 (H)   HDL Latest Ref Range: >=40 mg/dL 44   LDL Latest Ref Range: <100 mg/dL 147 (H)   Advised on healthy diet.     HTN (hypertension)  Well controlled on hctz 25 mg  She has no chest pain or swelling in her legs.       Left upper quadrant pain  Ever since her left lung wedge resection (benign lung nodule), she has tenderness under her ribs on the left side.   Not severe enough to need tylenol. Her main concern is that it looks slightly swollen in the area.            Current medicines (including changes today)  Current Outpatient Prescriptions   Medication Sig Dispense Refill   • levothyroxine (SYNTHROID) 200 MCG Tab TAKE 1 TABLET BY MOUTH ONCE DAILY .  NEEDS  TO  GET  LABS  DONE. 30 Tab 0   • hydroCHLOROthiazide (HYDRODIURIL) 25 MG Tab TAKE ONE TABLET BY MOUTH ONCE DAILY 90 Tab 1   • aspirin 81 MG tablet Take 81 mg by mouth every day.     • Multiple Vitamin (MULTI VITAMIN PO) Take 1 Tab by mouth every day.       No current facility-administered medications for this visit.      She  has a past medical history of Arthritis; Hepatitis B (1970); HTN (hypertension); Hypothyroid; Insomnia; Jaundice (1970); and Unspecified cataract. She also has no past medical history of ASTHMA; COPD; Diabetes; or EMPHYSEMA.    ROS  No chest pain, no shortness of breath, no abdominal pain        "Objective:     Blood pressure 116/72, pulse 76, temperature 36.8 °C (98.3 °F), temperature source Temporal, resp. rate 16, height 1.676 m (5' 6\"), weight 89.8 kg (198 lb), SpO2 97 %, not currently breastfeeding. Body mass index is 31.96 kg/m².   Physical Exam:  Constitutional: Alert, no distress.  Skin: Warm, dry, good turgor, no rashes in visible areas.  Eye: Equal, round and reactive, conjunctiva clear, lids normal.  ENMT: Lips without lesions, good dentition, oropharynx clear.  Neck: Trachea midline, no masses, no thyromegaly. No cervical or supraclavicular lymphadenopathy  Respiratory: Unlabored respiratory effort, lungs clear to auscultation, no wheezes, no ronchi.  Cardiovascular: Normal S1, S2, no murmur, no edema.  Abdomen: Soft, non-tender, no masses, no hepatosplenomegaly.  Psych: Alert and oriented x3, normal affect and mood.        Assessment and Plan:   The following treatment plan was discussed    1. Hypothyroidism, unspecified type  Stable  recheck labs  - TSH WITH REFLEX TO FT4; Future    2. Primary insomnia  Chronic condition with no acute changes    3. Dyslipidemia  If persistent elevation after diet and lifestyle changes start statin.  - Lipid Profile; Future    4. Essential hypertension  Chronic condition well-controlled  - COMP METABOLIC PANEL; Future    5. Breast cancer screening  - MA-SCREENING MAMMO BILAT W/TOMOSYNTHESIS W/CAD; Future    6. Anemia, unspecified type  Recheck labs  - CBC WITH DIFFERENTIAL; Future    7. Elevated fasting blood sugar  Advised on diet changes  - HEMOGLOBIN A1C; Future    8. Left upper quadrant pain  Due to lung resection surgery.  No acute changes.  Continue to monitor.      Followup: Return in about 6 months (around 6/12/2019) for review labs, hypothyroidism, .         "

## 2018-12-31 DIAGNOSIS — I10 ESSENTIAL HYPERTENSION: ICD-10-CM

## 2018-12-31 NOTE — TELEPHONE ENCOUNTER
Was the patient seen in the last year in this department? Yes    Does patient have an active prescription for medications requested? No     Received Request Via: Pharmacy      Pt met protocol?: Yes    Pt last ov 12/2018   BP Readings from Last 1 Encounters:   12/12/18 116/72

## 2019-01-02 RX ORDER — HYDROCHLOROTHIAZIDE 25 MG/1
TABLET ORAL
Qty: 90 TAB | Refills: 1 | Status: SHIPPED | OUTPATIENT
Start: 2019-01-02 | End: 2019-07-10 | Stop reason: SDUPTHER

## 2019-01-17 ENCOUNTER — HOSPITAL ENCOUNTER (OUTPATIENT)
Dept: LAB | Facility: MEDICAL CENTER | Age: 69
End: 2019-01-17
Attending: FAMILY MEDICINE
Payer: MEDICARE

## 2019-01-17 DIAGNOSIS — E78.5 DYSLIPIDEMIA: ICD-10-CM

## 2019-01-17 DIAGNOSIS — D64.9 ANEMIA, UNSPECIFIED TYPE: ICD-10-CM

## 2019-01-17 DIAGNOSIS — E03.9 HYPOTHYROIDISM, UNSPECIFIED TYPE: ICD-10-CM

## 2019-01-17 DIAGNOSIS — I10 ESSENTIAL HYPERTENSION: ICD-10-CM

## 2019-01-17 DIAGNOSIS — R73.01 ELEVATED FASTING BLOOD SUGAR: ICD-10-CM

## 2019-01-17 LAB
ALBUMIN SERPL BCP-MCNC: 3.7 G/DL (ref 3.2–4.9)
ALBUMIN/GLOB SERPL: 1.3 G/DL
ALP SERPL-CCNC: 74 U/L (ref 30–99)
ALT SERPL-CCNC: 13 U/L (ref 2–50)
ANION GAP SERPL CALC-SCNC: 9 MMOL/L (ref 0–11.9)
AST SERPL-CCNC: 17 U/L (ref 12–45)
BASOPHILS # BLD AUTO: 0.8 % (ref 0–1.8)
BASOPHILS # BLD: 0.04 K/UL (ref 0–0.12)
BILIRUB SERPL-MCNC: 0.3 MG/DL (ref 0.1–1.5)
BUN SERPL-MCNC: 26 MG/DL (ref 8–22)
CALCIUM SERPL-MCNC: 9.1 MG/DL (ref 8.5–10.5)
CHLORIDE SERPL-SCNC: 106 MMOL/L (ref 96–112)
CHOLEST SERPL-MCNC: 202 MG/DL (ref 100–199)
CO2 SERPL-SCNC: 27 MMOL/L (ref 20–33)
CREAT SERPL-MCNC: 0.7 MG/DL (ref 0.5–1.4)
EOSINOPHIL # BLD AUTO: 0.14 K/UL (ref 0–0.51)
EOSINOPHIL NFR BLD: 2.7 % (ref 0–6.9)
ERYTHROCYTE [DISTWIDTH] IN BLOOD BY AUTOMATED COUNT: 42.5 FL (ref 35.9–50)
EST. AVERAGE GLUCOSE BLD GHB EST-MCNC: 108 MG/DL
FASTING STATUS PATIENT QL REPORTED: NORMAL
GLOBULIN SER CALC-MCNC: 2.8 G/DL (ref 1.9–3.5)
GLUCOSE SERPL-MCNC: 83 MG/DL (ref 65–99)
HBA1C MFR BLD: 5.4 % (ref 0–5.6)
HCT VFR BLD AUTO: 38.8 % (ref 37–47)
HDLC SERPL-MCNC: 48 MG/DL
HGB BLD-MCNC: 12.5 G/DL (ref 12–16)
IMM GRANULOCYTES # BLD AUTO: 0.01 K/UL (ref 0–0.11)
IMM GRANULOCYTES NFR BLD AUTO: 0.2 % (ref 0–0.9)
LDLC SERPL CALC-MCNC: 136 MG/DL
LYMPHOCYTES # BLD AUTO: 1.39 K/UL (ref 1–4.8)
LYMPHOCYTES NFR BLD: 26.4 % (ref 22–41)
MCH RBC QN AUTO: 29.2 PG (ref 27–33)
MCHC RBC AUTO-ENTMCNC: 32.2 G/DL (ref 33.6–35)
MCV RBC AUTO: 90.7 FL (ref 81.4–97.8)
MONOCYTES # BLD AUTO: 0.44 K/UL (ref 0–0.85)
MONOCYTES NFR BLD AUTO: 8.3 % (ref 0–13.4)
NEUTROPHILS # BLD AUTO: 3.25 K/UL (ref 2–7.15)
NEUTROPHILS NFR BLD: 61.6 % (ref 44–72)
NRBC # BLD AUTO: 0 K/UL
NRBC BLD-RTO: 0 /100 WBC
PLATELET # BLD AUTO: 293 K/UL (ref 164–446)
PMV BLD AUTO: 10.4 FL (ref 9–12.9)
POTASSIUM SERPL-SCNC: 3.6 MMOL/L (ref 3.6–5.5)
PROT SERPL-MCNC: 6.5 G/DL (ref 6–8.2)
RBC # BLD AUTO: 4.28 M/UL (ref 4.2–5.4)
SODIUM SERPL-SCNC: 142 MMOL/L (ref 135–145)
TRIGL SERPL-MCNC: 88 MG/DL (ref 0–149)
TSH SERPL DL<=0.005 MIU/L-ACNC: 0.99 UIU/ML (ref 0.38–5.33)
WBC # BLD AUTO: 5.3 K/UL (ref 4.8–10.8)

## 2019-01-17 PROCEDURE — 85025 COMPLETE CBC W/AUTO DIFF WBC: CPT

## 2019-01-17 PROCEDURE — 80053 COMPREHEN METABOLIC PANEL: CPT

## 2019-01-17 PROCEDURE — 80061 LIPID PANEL: CPT

## 2019-01-17 PROCEDURE — 84443 ASSAY THYROID STIM HORMONE: CPT

## 2019-01-17 PROCEDURE — 36415 COLL VENOUS BLD VENIPUNCTURE: CPT

## 2019-01-17 PROCEDURE — 83036 HEMOGLOBIN GLYCOSYLATED A1C: CPT | Mod: GA

## 2019-01-21 NOTE — PROGRESS NOTES
Mojgan,  Your cholesterol levels have improved! No other concerns noted on your labs!!! Stay well hydrated.   Mike Valdez M.D.

## 2019-02-05 DIAGNOSIS — E03.9 HYPOTHYROIDISM, UNSPECIFIED TYPE: ICD-10-CM

## 2019-02-05 NOTE — TELEPHONE ENCOUNTER
Was the patient seen in the last year in this department? Yes    Does patient have an active prescription for medications requested? No     Received Request Via: Pharmacy    Pt met protocol?: Yes     Last OV 12/2018    TSH   Date Value Ref Range Status   01/17/2019 0.990 0.380 - 5.330 uIU/mL Final     Comment:     Please note new reference ranges effective 12/14/2017 10:00 AM  Pregnant Females, 1st Trimester  0.050-3.700  Pregnant Females, 2nd Trimester  0.310-4.350  Pregnant Females, 3rd Trimester  0.410-5.180

## 2019-02-06 RX ORDER — LEVOTHYROXINE SODIUM 0.2 MG/1
200 TABLET ORAL
Qty: 90 TAB | Refills: 3 | Status: SHIPPED | OUTPATIENT
Start: 2019-02-06

## 2019-04-24 ENCOUNTER — TELEPHONE (OUTPATIENT)
Dept: HEALTH INFORMATION MANAGEMENT | Facility: OTHER | Age: 69
End: 2019-04-24

## 2019-04-24 NOTE — TELEPHONE ENCOUNTER
Mojgan is due for a low-dose chest CT for annual lung cancer screening. Please review the pended order and sign. If this is a test you wish for Mojgan not to receive, please respond with reason why and route back to the Patient Outreach Team.

## 2019-05-13 NOTE — PROGRESS NOTES
Chief Complaint   Patient presents with   • Abdominal Pain     LUQ burning, swelling post surgery         This is a 68 y.o.female patient that presents today with the following: sl abd pain in LUQ, near recent surgical site    Left upper quadrant pain  Patient continues to struggle with left upper quadrant tenderness with palpation, she is status post left lung wedge resection due to a nodule.  Ever since her surgery she has had tenderness and a mild burning sensation as well as a sensation of fullness in the affected area.  She was recently seen in urgent care which abdominal x-ray showed only increased volume of stool indicating constipation.  She was treated with lactulose, she states this improved her constipation, but she still has that sensation in her left upper quadrant.  It is very mildly tender with palpation to the affected area.  It has not worsened, the pain has stayed about the same since her procedure in early June.  I did discuss with her that this is likely still related to her surgery and that she is likely aggravating it by certain movements such as lifting, pushing or pulling.  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  I advised her to continue with supportive measures including rest when able, heat or ice, over-the-counter analgesics avoiding aggravating movements.  She was advised if symptoms do not improve over the next couple weeks or if they worsen she is to follow-up with her PCP as well as her surgeon.      No visits with results within 1 Month(s) from this visit.   Latest known visit with results is:   Admission on 06/05/2018, Discharged on 06/07/2018   Component Date Value   • WBC 06/06/2018 8.9    • RBC 06/06/2018 3.83*   • Hemoglobin 06/06/2018 11.3*   • Hematocrit 06/06/2018 34.2*   • MCV 06/06/2018 89.3    • MCH 06/06/2018 29.5    • MCHC 06/06/2018 33.0*   • RDW 06/06/2018 41.2    • Platelet Count  06/06/2018 241    • MPV 06/06/2018 10.1    • Sodium 06/06/2018 140    • Potassium 06/06/2018 3.7    • Chloride 06/06/2018 107    • Co2 06/06/2018 26    • Glucose 06/06/2018 127*   • Bun 06/06/2018 13    • Creatinine 06/06/2018 0.51    • Calcium 06/06/2018 8.4*   • Anion Gap 06/06/2018 7.0    • GFR If  06/06/2018 >60    • GFR If Non  Ameri* 06/06/2018 >60          clinical course has been stable    Past Medical History:   Diagnosis Date   • Arthritis     OA   • Hepatitis B 1970   • HTN (hypertension)    • Hypothyroid    • Insomnia    • Jaundice 1970   • Unspecified cataract     bilateral IOL       Past Surgical History:   Procedure Laterality Date   • THORACOSCOPY Left 6/5/2018    Procedure: THORACOSCOPY-  WEDGE RESECTION UPPER LOBE;  Surgeon: John H Ganser, M.D.;  Location: [facility];  Service: General   • CATARACT PHACO WITH IOL  3/17/2015    Performed by Gabriel Manning M.D. at [facility]   • GYN SURGERY      C Section       Family History   Problem Relation Age of Onset   • Cancer Mother      Lung cancer   • Cancer Maternal Uncle 50     Lung cancer   • Cancer Maternal Grandmother      Lung cancer   • Cancer Maternal Grandfather      Lung cancer       Betadine [povidone iodine] and Sulfa drugs    Current Outpatient Prescriptions Ordered in [facility]   Medication Sig Dispense Refill   • aspirin 81 MG tablet Take 81 mg by mouth every day.     • Multiple Vitamin (MULTI VITAMIN PO) Take 1 Tab by mouth every day.     • hydrochlorothiazide (HYDRODIURIL) 25 MG Tab Take 1 Tab by mouth every day. 90 Tab 3   • levothyroxine (SYNTHROID) 200 MCG Tab Take 1 Tab by mouth every day. 90 Tab 3     No current Epic-ordered facility-administered medications on file.        Constitutional ROS: No unexpected change in weight, No weakness, No unexplained fevers, sweats, or chills  Pulmonary ROS: No chronic cough, sputum, or hemoptysis, No shortness of breath, No recent change in  "breathing  Cardiovascular ROS: No chest pain, No edema, No palpitations  Gastrointestinal ROS: positive per HPI  Musculoskeletal/Extremities ROS: No clubbing, No peripheral edema, No pain, redness or swelling on the joints  Neurologic ROS: Normal development, No seizures, No weakness    Physical exam:  /64   Pulse 82   Temp 36.7 °C (98.1 °F)   Resp 16   Ht 1.676 m (5' 6\")   Wt 91.2 kg (201 lb)   SpO2 95%   BMI 32.44 kg/m²   General Appearance: older female, alert, no distress, obese, well groomed  Skin: Skin color, texture, turgor normal. No rashes or lesions.  Lungs: negative findings: normal respiratory rate and rhythm, lungs clear to auscultation  Heart: negative. RRR without murmur, gallop, or rubs.  No ectopy.  Abdomen: positive for mild tenderness with palpation to LUQ, but beneath lower ribs as well as the mid-axillary line at same level.   Musculoskeletal: negative findings: ROM of all joints is normal, no evidence of muscle atrophy, no deformities present  Neurologic: intact, CN 2-12 grossly intact.     Medical decision making/discussion: discussed supportive measures, rest, ice or heat, OTC analgesics. Also advised to try avoiding aggravating movements to the affected area. If symptoms do not improve over the next couple of week, or if they worsen, she is to follow up with her PCP.    Mojgan was seen today for abdominal pain.    Diagnoses and all orders for this visit:    Left upper quadrant pain          Please note that this dictation was created using voice recognition software. I have made every reasonable attempt to correct obvious errors, but I expect that there are errors of grammar and possibly content that I did not discover before finalizing the note.        " nursing staff house staff and Dr. Westbrook

## 2019-05-20 ENCOUNTER — TELEPHONE (OUTPATIENT)
Dept: OTHER | Facility: MEDICAL CENTER | Age: 69
End: 2019-05-20

## 2019-05-20 DIAGNOSIS — Z87.891 HISTORY OF TOBACCO USE: ICD-10-CM

## 2019-05-20 DIAGNOSIS — Z12.2 ENCOUNTER FOR SCREENING FOR MALIGNANT NEOPLASM OF RESPIRATORY ORGANS: ICD-10-CM

## 2019-06-07 ENCOUNTER — PATIENT OUTREACH (OUTPATIENT)
Dept: OTHER | Facility: MEDICAL CENTER | Age: 69
End: 2019-06-07

## 2019-07-04 ENCOUNTER — PATIENT OUTREACH (OUTPATIENT)
Dept: OTHER | Facility: MEDICAL CENTER | Age: 69
End: 2019-07-04

## 2019-07-10 DIAGNOSIS — I10 ESSENTIAL HYPERTENSION: ICD-10-CM

## 2019-07-11 NOTE — TELEPHONE ENCOUNTER
Was the patient seen in the last year in this department? Yes    Does patient have an active prescription for medications requested? No     Received Request Via: Pharmacy      Pt met protocol?: No, OV 12/18   BP Readings from Last 1 Encounters:   12/12/18 116/72

## 2019-07-12 RX ORDER — HYDROCHLOROTHIAZIDE 25 MG/1
TABLET ORAL
Qty: 90 TAB | Refills: 1 | Status: SHIPPED | OUTPATIENT
Start: 2019-07-12 | End: 2020-01-02

## 2019-07-12 NOTE — TELEPHONE ENCOUNTER
Refill X 6 months, sent to pharmacy.Pt. Seen in the last 6 months per protocol.   Lab Results   Component Value Date/Time    SODIUM 142 01/17/2019 06:27 AM    POTASSIUM 3.6 01/17/2019 06:27 AM    CHLORIDE 106 01/17/2019 06:27 AM    CO2 27 01/17/2019 06:27 AM    GLUCOSE 83 01/17/2019 06:27 AM    BUN 26 (H) 01/17/2019 06:27 AM    CREATININE 0.70 01/17/2019 06:27 AM

## 2019-08-05 ENCOUNTER — OFFICE VISIT (OUTPATIENT)
Dept: MEDICAL GROUP | Facility: PHYSICIAN GROUP | Age: 69
End: 2019-08-05
Payer: MEDICARE

## 2019-08-05 VITALS
DIASTOLIC BLOOD PRESSURE: 76 MMHG | TEMPERATURE: 97.8 F | OXYGEN SATURATION: 94 % | WEIGHT: 197 LBS | RESPIRATION RATE: 16 BRPM | SYSTOLIC BLOOD PRESSURE: 130 MMHG | BODY MASS INDEX: 31.8 KG/M2 | HEART RATE: 66 BPM

## 2019-08-05 DIAGNOSIS — E78.5 DYSLIPIDEMIA: ICD-10-CM

## 2019-08-05 DIAGNOSIS — I10 ESSENTIAL HYPERTENSION: ICD-10-CM

## 2019-08-05 DIAGNOSIS — E66.9 OBESITY (BMI 30-39.9): ICD-10-CM

## 2019-08-05 DIAGNOSIS — E55.9 VITAMIN D DEFICIENCY: ICD-10-CM

## 2019-08-05 DIAGNOSIS — E03.9 HYPOTHYROIDISM, UNSPECIFIED TYPE: ICD-10-CM

## 2019-08-05 DIAGNOSIS — R07.89 ATYPICAL CHEST PAIN: ICD-10-CM

## 2019-08-05 PROCEDURE — 99214 OFFICE O/P EST MOD 30 MIN: CPT | Performed by: NURSE PRACTITIONER

## 2019-08-05 NOTE — PATIENT INSTRUCTIONS
Referral to there [facility] Improvement program    Labs before you see Dr. OLIVEROS in January    EKG today

## 2019-08-05 NOTE — ASSESSMENT & PLAN NOTE
Patient started having intermittent left chest wall pain about 3 days ago, associated with exertion.  She has been shoveling rock but she has been doing this for a long time.  There is no associated shortness of breath, diaphoresis or near syncope.  She can reproduce the pain with palpation.  However we will still get EKG in office today.  Of note in office EKG completely normal, no evidence of ischemia or arrhythmia.  Discussed with her this is likely musculoskeletal in nature, discussed the importance of supportive care including rest of the area, heat, over-the-counter analgesics.  However she was given very strict ER precautions should she develop chest pain again associated with shortness of breath, diaphoresis or near syncope.

## 2019-08-05 NOTE — PROGRESS NOTES
Chief Complaint   Patient presents with   • Foot Pain     Bilateral foot pain    • Knee Pain     Left knee          This is a 69 y.o.female patient that presents today with the followin-month follow-up    Atypical chest pain  Patient started having intermittent left chest wall pain about 3 days ago, associated with exertion.  She has been shoveling rock but she has been doing this for a long time.  There is no associated shortness of breath, diaphoresis or near syncope.  She can reproduce the pain with palpation.  However we will still get EKG in office today.  Of note in office EKG completely normal, no evidence of ischemia or arrhythmia.  Discussed with her this is likely musculoskeletal in nature, discussed the importance of supportive care including rest of the area, heat, over-the-counter analgesics.  However she was given very strict ER precautions should she develop chest pain again associated with shortness of breath, diaphoresis or near syncope.    Dyslipidemia  Patient not currently on statin, she will be due for labs before she follows up with her primary care provider in January, these been ordered.  Discussed the importance of healthy diet, regular exercise and efforts towards weight loss.    HTN (hypertension)  Chronic, stable well-controlled on medications including hydrochlorthiazide.  Blood pressure today within normal limits and she denies symptoms of hypertension.  She will be due for labs before she follows up with her primary care provider in January.    Hypothyroid  Chronic, stable, fairly well controlled on current medications including levothyroxine 200 mcg daily.  Her last labs in 2019 were well within normal limits, she denies symptoms of hypothyroidism.  She will be due for labs before she also with her PCP in January.    Obesity (BMI 30-39.9)  Patient continues to struggle with her weight, she states that she does not feel she eats very much and exercises adequately.  We  discussed the importance of knowing exactly how many calories she is consuming in a day, that will determine if she is eating too much.  We discussed the importance of regular physical activity as well.  She does agree to referral to the [facility] improvement program, referral placed.    Vitamin D deficiency  Patient does have history of vitamin D deficiency, she is due for labs before she follows up in 6 months.      No visits with results within 1 Month(s) from this visit.   Latest known visit with results is:   Hospital Outpatient Visit on 01/17/2019   Component Date Value   • TSH 01/17/2019 0.990    • WBC 01/17/2019 5.3    • RBC 01/17/2019 4.28    • Hemoglobin 01/17/2019 12.5    • Hematocrit 01/17/2019 38.8    • MCV 01/17/2019 90.7    • MCH 01/17/2019 29.2    • MCHC 01/17/2019 32.2*   • RDW 01/17/2019 42.5    • Platelet Count 01/17/2019 293    • MPV 01/17/2019 10.4    • Neutrophils-Polys 01/17/2019 61.60    • Lymphocytes 01/17/2019 26.40    • Monocytes 01/17/2019 8.30    • Eosinophils 01/17/2019 2.70    • Basophils 01/17/2019 0.80    • Immature Granulocytes 01/17/2019 0.20    • Nucleated RBC 01/17/2019 0.00    • Neutrophils (Absolute) 01/17/2019 3.25    • Lymphs (Absolute) 01/17/2019 1.39    • Monos (Absolute) 01/17/2019 0.44    • Eos (Absolute) 01/17/2019 0.14    • Baso (Absolute) 01/17/2019 0.04    • Immature Granulocytes (a* 01/17/2019 0.01    • NRBC (Absolute) 01/17/2019 0.00    • Sodium 01/17/2019 142    • Potassium 01/17/2019 3.6    • Chloride 01/17/2019 106    • Co2 01/17/2019 27    • Anion Gap 01/17/2019 9.0    • Glucose 01/17/2019 83    • Bun 01/17/2019 26*   • Creatinine 01/17/2019 0.70    • Calcium 01/17/2019 9.1    • AST(SGOT) 01/17/2019 17    • ALT(SGPT) 01/17/2019 13    • Alkaline Phosphatase 01/17/2019 74    • Total Bilirubin 01/17/2019 0.3    • Albumin 01/17/2019 3.7    • Total Protein 01/17/2019 6.5    • Globulin 01/17/2019 2.8    • A-G Ratio 01/17/2019 1.3    • Glycohemoglobin 01/17/2019  5.4    • Est Avg Glucose 01/17/2019 108    • Cholesterol,Tot 01/17/2019 202*   • Triglycerides 01/17/2019 88    • HDL 01/17/2019 48    • LDL 01/17/2019 136*   • Fasting Status 01/17/2019 Fasting    • GFR If  01/17/2019 >60    • GFR If Non  Ameri* 01/17/2019 >60          clinical course has been stable    Past Medical History:   Diagnosis Date   • Arthritis     OA   • Hepatitis B 1970   • HTN (hypertension)    • Hypothyroid    • Insomnia    • Jaundice 1970   • Unspecified cataract     bilateral IOL       Past Surgical History:   Procedure Laterality Date   • THORACOSCOPY Left 6/5/2018    Procedure: THORACOSCOPY-  WEDGE RESECTION UPPER LOBE;  Surgeon: John H Ganser, M.D.;  Location: SURGERY [facility];  Service: General   • CATARACT PHACO WITH IOL  3/17/2015    Performed by Gabriel Manning M.D. at [facility]   • GYN SURGERY      C Section       Family History   Problem Relation Age of Onset   • Cancer Mother         Lung cancer   • Cancer Maternal Uncle 50        Lung cancer   • Cancer Maternal Grandmother         Lung cancer   • Cancer Maternal Grandfather         Lung cancer       Betadine [povidone iodine] and Sulfa drugs    Current Outpatient Medications Ordered in Epic   Medication Sig Dispense Refill   • hydroCHLOROthiazide (HYDRODIURIL) 25 MG Tab TAKE 1 TABLET BY MOUTH ONCE DAILY 90 Tab 1   • levothyroxine (SYNTHROID) 200 MCG Tab Take 1 Tab by mouth every day. 90 Tab 3   • aspirin 81 MG tablet Take 81 mg by mouth every day.     • Multiple Vitamin (MULTI VITAMIN PO) Take 1 Tab by mouth every day.       No current [facility]-ordered facility-administered medications on file.        Constitutional ROS: No unexpected change in weight, No weakness, No unexplained fevers, sweats, or chills  Pulmonary ROS: No chronic cough, sputum, or hemoptysis, No shortness of breath, No recent change in breathing  Cardiovascular ROS: Positive per HPI  Gastrointestinal ROS: No abdominal pain, No  nausea, vomiting, diarrhea, or constipation, Positive for mild obesity  Musculoskeletal/Extremities ROS: Positive for chronic knee pain  Neurologic ROS: Normal development, No seizures, No weakness  Endocrine ROS: Positive per HPI    Physical exam:  /76   Pulse 66   Temp 36.6 °C (97.8 °F) (Temporal)   Resp 16   Wt 89.4 kg (197 lb)   SpO2 94%   BMI 31.80 kg/m²   General Appearance: Pleasant older female, alert, no distress, obese, well-groomed  Skin: Skin color, texture, turgor normal. No rashes or lesions.  Lungs: negative findings: normal respiratory rate and rhythm, lungs clear to auscultation  Heart: negative. RRR without murmur, gallop, or rubs.  No ectopy  Abdomen: Abdomen soft, non-tender. BS normal. No masses,  No organomegaly  Musculoskeletal: positive findings: Tenderness to upper chest wall on the left  Neurologic: intact    Medical decision making/discussion: Discussed with patient that in office EKG is negative, symptoms are most likely musculoskeletal but she was given very strict ER precautions.  She is going to follow-up with her regular PCP in 6 months with labs done before visit.  She was encouraged to continue care per orthopedic specialist for her bilateral knee pain.  And she will be referred to the [facility] improvement program for assistance with weight loss.    Mojgan was seen today for foot pain and knee pain.    Diagnoses and all orders for this visit:    Atypical chest pain  -     EKG - Clinic Performed    Essential hypertension  -     CBC WITH DIFFERENTIAL; Future  -     Comp Metabolic Panel; Future  -     Lipid Profile; Future    Hypothyroidism, unspecified type  -     TSH WITH REFLEX TO FT4; Future    Dyslipidemia    Obesity (BMI 30-39.9)  -     REFERRAL TO [facility] IMPROVEMENT PROGRAMS (HIP) Services Requested: General-HIP Staff to Evaluate Best Program; Reason for Visit: Overweight/Obesity    Vitamin D deficiency  -     VITAMIN D,25 HYDROXY; Future        Return in  about 6 months (around 2/5/2020) for Follow-up, Discuss Labs.        Please note that this dictation was created using voice recognition software. I have made every reasonable attempt to correct obvious errors, but I expect that there are errors of grammar and possibly content that I did not discover before finalizing the note.

## 2019-08-06 PROBLEM — E55.9 VITAMIN D DEFICIENCY: Status: ACTIVE | Noted: 2019-08-06

## 2019-08-06 NOTE — ASSESSMENT & PLAN NOTE
Chronic, stable well-controlled on medications including hydrochlorthiazide.  Blood pressure today within normal limits and she denies symptoms of hypertension.  She will be due for labs before she follows up with her primary care provider in January.

## 2019-08-06 NOTE — ASSESSMENT & PLAN NOTE
Chronic, stable, fairly well controlled on current medications including levothyroxine 200 mcg daily.  Her last labs in January 2019 were well within normal limits, she denies symptoms of hypothyroidism.  She will be due for labs before she also with her PCP in January.

## 2019-08-06 NOTE — ASSESSMENT & PLAN NOTE
Patient does have history of vitamin D deficiency, she is due for labs before she follows up in 6 months.

## 2019-08-06 NOTE — ASSESSMENT & PLAN NOTE
Patient not currently on statin, she will be due for labs before she follows up with her primary care provider in January, these been ordered.  Discussed the importance of healthy diet, regular exercise and efforts towards weight loss.

## 2019-08-06 NOTE — ASSESSMENT & PLAN NOTE
Patient continues to struggle with her weight, she states that she does not feel she eats very much and exercises adequately.  We discussed the importance of knowing exactly how many calories she is consuming in a day, that will determine if she is eating too much.  We discussed the importance of regular physical activity as well.  She does agree to referral to the [facility], referral placed.

## 2019-08-07 ENCOUNTER — HOSPITAL ENCOUNTER (OUTPATIENT)
Dept: RADIOLOGY | Facility: MEDICAL CENTER | Age: 69
End: 2019-08-07
Attending: FAMILY MEDICINE
Payer: MEDICARE

## 2019-08-07 DIAGNOSIS — Z12.2 ENCOUNTER FOR SCREENING FOR MALIGNANT NEOPLASM OF RESPIRATORY ORGANS: ICD-10-CM

## 2019-08-07 DIAGNOSIS — Z87.891 HISTORY OF TOBACCO USE: ICD-10-CM

## 2019-08-07 PROCEDURE — G0297 LDCT FOR LUNG CA SCREEN: HCPCS

## 2019-08-07 NOTE — RESULT ENCOUNTER NOTE
Mojgan,  Good news your CT scan of the chest looks stable with no abnormal changes.  Mike Valdez M.D.

## 2019-10-24 ENCOUNTER — OFFICE VISIT (OUTPATIENT)
Dept: URGENT CARE | Facility: PHYSICIAN GROUP | Age: 69
End: 2019-10-24
Payer: MEDICARE

## 2019-10-24 VITALS
SYSTOLIC BLOOD PRESSURE: 136 MMHG | OXYGEN SATURATION: 97 % | WEIGHT: 187 LBS | HEART RATE: 60 BPM | DIASTOLIC BLOOD PRESSURE: 70 MMHG | TEMPERATURE: 99 F | BODY MASS INDEX: 30.18 KG/M2 | RESPIRATION RATE: 16 BRPM

## 2019-10-24 DIAGNOSIS — J06.9 URI WITH COUGH AND CONGESTION: ICD-10-CM

## 2019-10-24 PROCEDURE — 99214 OFFICE O/P EST MOD 30 MIN: CPT | Performed by: PHYSICIAN ASSISTANT

## 2019-10-24 RX ORDER — CODEINE PHOSPHATE AND GUAIFENESIN 10; 100 MG/5ML; MG/5ML
5 SOLUTION ORAL EVERY 12 HOURS PRN
Qty: 100 ML | Refills: 0 | Status: SHIPPED | OUTPATIENT
Start: 2019-10-24 | End: 2019-11-03

## 2019-10-24 RX ORDER — DOXYCYCLINE HYCLATE 100 MG
100 TABLET ORAL 2 TIMES DAILY
Qty: 20 TAB | Refills: 0 | Status: SHIPPED | OUTPATIENT
Start: 2019-10-24

## 2019-10-24 NOTE — PROGRESS NOTES
Chief Complaint   Patient presents with   • Otalgia     sore throat, congestion, headache, throat feels better if she gargles with mouthwash, lymph nodes are swollen on neck x4 days        HISTORY OF PRESENT ILLNESS: Patient is a 69 y.o. female who presents today for the following:    Patient comes in for evaluation of a 3 to 4-day history of cough.  She reports associated body aches, nasal drainage, sore throat, and ear pressure.  She has had difficulty sleeping due to cough.  Over-the-counter medication has been helping take the edge off of her symptoms.  She smoked 2 packs a day for 45 years but quit smoking 12 years ago.  Prior to that her mother smoked 3 packs a day when she was growing up.    Patient Active Problem List    Diagnosis Date Noted   • Vitamin D deficiency 08/06/2019   • Atypical chest pain 08/05/2019   • Insomnia 12/12/2018   • Left upper quadrant pain 07/16/2018   • Cervical cancer screening 03/21/2018   • Sensorineural hearing loss (SNHL) of both ears 12/12/2017   • Obesity (BMI 30-39.9) 12/12/2017   • Dyslipidemia 08/14/2017   • HTN (hypertension)    • Hypothyroid        Allergies:Betadine [povidone iodine] and Sulfa drugs    Current Outpatient Medications Ordered in Epic   Medication Sig Dispense Refill   • doxycycline (VIBRAMYCIN) 100 MG Tab Take 1 Tab by mouth 2 times a day. 20 Tab 0   • guaifenesin-codeine (ROBITUSSIN AC) Solution oral solution Take 5 mL by mouth every 12 hours as needed for Cough for up to 10 days. 100 mL 0   • hydroCHLOROthiazide (HYDRODIURIL) 25 MG Tab TAKE 1 TABLET BY MOUTH ONCE DAILY 90 Tab 1   • levothyroxine (SYNTHROID) 200 MCG Tab Take 1 Tab by mouth every day. 90 Tab 3   • aspirin 81 MG tablet Take 81 mg by mouth every day.     • Multiple Vitamin (MULTI VITAMIN PO) Take 1 Tab by mouth every day.       No current [facility]-ordered facility-administered medications on file.        Past Medical History:   Diagnosis Date   • Arthritis     OA   • Hepatitis B 1970   • HTN  (hypertension)    • Hypothyroid    • Insomnia    • Jaundice    • Unspecified cataract     bilateral IOL       Social History     Tobacco Use   • Smoking status: Former Smoker     Packs/day: 2.00     Years: 40.00     Pack years: 80.00     Types: Cigarettes     Last attempt to quit: 2007     Years since quittin.8   • Smokeless tobacco: Never Used   • Tobacco comment: 60 pack years   Substance Use Topics   • Alcohol use: Yes     Comment: 1 per week    • Drug use: Yes     Comment: edible marijuana 0-1 per week       Family Status   Relation Name Status   • Mo     • MUnc     • MGMo     • MGFa       Family History   Problem Relation Age of Onset   • Cancer Mother         Lung cancer   • Cancer Maternal Uncle 50        Lung cancer   • Cancer Maternal Grandmother         Lung cancer   • Cancer Maternal Grandfather         Lung cancer       Review of Systems:   Constitutional ROS: No unexpected change in weight, No weakness, No fatigue  Eye ROS: No recent significant change in vision, No eye pain, redness, discharge  Ear ROS: No drainage, No tinnitus or vertigo, No recent change in hearing  Mouth/Throat ROS: No teeth or gum problems, No bleeding gums, No tongue complaints  Neck ROS: No swollen glands, No significant pain in neck  Pulmonary ROS: No chronic cough, sputum, or hemoptysis, No dyspnea on exertion, No wheezing  Cardiovascular ROS: No diaphoresis, No edema, No palpitations  Gastrointestinal ROS: No change in bowel habits, No significant change in appetite, No nausea, vomiting, diarrhea, or constipation  Musculoskeletal/Extremities ROS: No peripheral edema, No pain, redness or swelling on the joints  Hematologic/Lymphatic ROS: Positive for body aches.  Skin/Integumentary ROS: No edema, No evidence of rash, No itching      Exam:  /70   Pulse 60   Temp 37.2 °C (99 °F)   Resp 16   Wt 84.8 kg (187 lb)   SpO2 97%   General: Well developed, well nourished. No  distress.  Eye: PERRL/EOMI; conjunctivae clear, lids normal.  ENMT: Lips without lesions, MMM. Oropharynx is clear. Bilateral TMs are within normal limits.  Neck: Trachea midline, no masses. No thyromegaly.  Pulmonary: Unlabored respiratory effort. Lungs clear to auscultation, no wheezes, no rhonchi.  Cardiovascular: Regular rate and rhythm without murmur.    Neurologic: Grossly nonfocal. No facial asymmetry noted.  Lymph: No cervical lymphadenopathy noted.  Skin: Warm, dry, good turgor. No rashes in visible areas.   Psych: Normal mood. Alert and oriented x3. Judgment and insight is normal.    Assessment/Plan:  Discussed likely viral etiology. Discussed appropriate over-the-counter symptomatic medication, and when to return to clinic. Contingent antibiotic prescription given to patient to fill upon meeting criteria of guidelines discussed. Follow up for worsening or persistent symptoms.  1. URI with cough and congestion  doxycycline (VIBRAMYCIN) 100 MG Tab    guaifenesin-codeine (ROBITUSSIN AC) Solution oral solution

## 2019-12-31 DIAGNOSIS — I10 ESSENTIAL HYPERTENSION: ICD-10-CM

## 2020-01-02 RX ORDER — HYDROCHLOROTHIAZIDE 25 MG/1
TABLET ORAL
Qty: 90 TAB | Refills: 1 | Status: SHIPPED | OUTPATIENT
Start: 2020-01-02

## 2020-01-03 RX ORDER — HYDROCHLOROTHIAZIDE 25 MG/1
25 TABLET ORAL
Qty: 90 TAB | Refills: 1 | OUTPATIENT
Start: 2020-01-03

## 2020-01-17 NOTE — RESULT ENCOUNTER NOTE
Mojgan  Your mammogram was normal! Next is due in one year.   Please let me know immediately if you notice any new lumps/rashes/pain/nipple discharge.  Mike Valdez M.D.

## 2020-07-23 ENCOUNTER — TELEPHONE (OUTPATIENT)
Dept: SCHEDULING | Facility: IMAGING CENTER | Age: 70
End: 2020-07-23

## 2021-10-06 ENCOUNTER — PATIENT MESSAGE (OUTPATIENT)
Dept: HEALTH INFORMATION MANAGEMENT | Facility: OTHER | Age: 71
End: 2021-10-06

## 2023-05-23 ENCOUNTER — APPOINTMENT (RX ONLY)
Dept: URBAN - NONMETROPOLITAN AREA CLINIC 15 | Facility: CLINIC | Age: 73
Setting detail: DERMATOLOGY
End: 2023-05-23

## 2023-05-23 DIAGNOSIS — L81.4 OTHER MELANIN HYPERPIGMENTATION: ICD-10-CM

## 2023-05-23 DIAGNOSIS — D18.0 HEMANGIOMA: ICD-10-CM

## 2023-05-23 DIAGNOSIS — Z71.89 OTHER SPECIFIED COUNSELING: ICD-10-CM

## 2023-05-23 DIAGNOSIS — L82.1 OTHER SEBORRHEIC KERATOSIS: ICD-10-CM

## 2023-05-23 DIAGNOSIS — D22 MELANOCYTIC NEVI: ICD-10-CM

## 2023-05-23 DIAGNOSIS — L57.0 ACTINIC KERATOSIS: ICD-10-CM

## 2023-05-23 PROBLEM — D22.5 MELANOCYTIC NEVI OF TRUNK: Status: ACTIVE | Noted: 2023-05-23

## 2023-05-23 PROBLEM — D22.71 MELANOCYTIC NEVI OF RIGHT LOWER LIMB, INCLUDING HIP: Status: ACTIVE | Noted: 2023-05-23

## 2023-05-23 PROBLEM — D18.01 HEMANGIOMA OF SKIN AND SUBCUTANEOUS TISSUE: Status: ACTIVE | Noted: 2023-05-23

## 2023-05-23 PROBLEM — D22.61 MELANOCYTIC NEVI OF RIGHT UPPER LIMB, INCLUDING SHOULDER: Status: ACTIVE | Noted: 2023-05-23

## 2023-05-23 PROBLEM — D22.72 MELANOCYTIC NEVI OF LEFT LOWER LIMB, INCLUDING HIP: Status: ACTIVE | Noted: 2023-05-23

## 2023-05-23 PROBLEM — D22.62 MELANOCYTIC NEVI OF LEFT UPPER LIMB, INCLUDING SHOULDER: Status: ACTIVE | Noted: 2023-05-23

## 2023-05-23 PROCEDURE — 99203 OFFICE O/P NEW LOW 30 MIN: CPT | Mod: 25

## 2023-05-23 PROCEDURE — ? LIQUID NITROGEN

## 2023-05-23 PROCEDURE — ? COUNSELING

## 2023-05-23 PROCEDURE — 17000 DESTRUCT PREMALG LESION: CPT

## 2023-05-23 PROCEDURE — 17003 DESTRUCT PREMALG LES 2-14: CPT

## 2023-05-23 ASSESSMENT — LOCATION DETAILED DESCRIPTION DERM
LOCATION DETAILED: RIGHT UPPER CUTANEOUS LIP
LOCATION DETAILED: XIPHOID
LOCATION DETAILED: MIDDLE STERNUM
LOCATION DETAILED: RIGHT VENTRAL PROXIMAL FOREARM
LOCATION DETAILED: SUBXIPHOID
LOCATION DETAILED: RIGHT DISTAL POSTERIOR THIGH
LOCATION DETAILED: LEFT VENTRAL LATERAL PROXIMAL FOREARM
LOCATION DETAILED: LEFT POPLITEAL SKIN
LOCATION DETAILED: RIGHT MEDIAL SUPERIOR CHEST
LOCATION DETAILED: LEFT VENTRAL PROXIMAL FOREARM
LOCATION DETAILED: RIGHT POPLITEAL SKIN
LOCATION DETAILED: RIGHT INFERIOR CENTRAL MALAR CHEEK
LOCATION DETAILED: LEFT ANTERIOR DISTAL UPPER ARM
LOCATION DETAILED: NASAL DORSUM
LOCATION DETAILED: RIGHT ANTERIOR PROXIMAL UPPER ARM
LOCATION DETAILED: LEFT DISTAL POSTERIOR THIGH
LOCATION DETAILED: LEFT LATERAL PROXIMAL PRETIBIAL REGION
LOCATION DETAILED: LEFT INFERIOR LATERAL MIDBACK
LOCATION DETAILED: LEFT ANTERIOR PROXIMAL UPPER ARM
LOCATION DETAILED: RIGHT CENTRAL MALAR CHEEK
LOCATION DETAILED: INFERIOR THORACIC SPINE
LOCATION DETAILED: NASAL SUPRATIP
LOCATION DETAILED: RIGHT PROXIMAL PRETIBIAL REGION
LOCATION DETAILED: RIGHT ANTECUBITAL SKIN
LOCATION DETAILED: LEFT INFERIOR MEDIAL UPPER BACK
LOCATION DETAILED: LEFT SUPERIOR MEDIAL MIDBACK
LOCATION DETAILED: RIGHT ANTERIOR DISTAL UPPER ARM
LOCATION DETAILED: LEFT PROXIMAL PRETIBIAL REGION

## 2023-05-23 ASSESSMENT — LOCATION SIMPLE DESCRIPTION DERM
LOCATION SIMPLE: RIGHT FOREARM
LOCATION SIMPLE: LEFT POSTERIOR THIGH
LOCATION SIMPLE: CHEST
LOCATION SIMPLE: RIGHT CHEEK
LOCATION SIMPLE: RIGHT PRETIBIAL REGION
LOCATION SIMPLE: LEFT LOWER BACK
LOCATION SIMPLE: LEFT POPLITEAL SKIN
LOCATION SIMPLE: NOSE
LOCATION SIMPLE: LEFT FOREARM
LOCATION SIMPLE: UPPER BACK
LOCATION SIMPLE: RIGHT POSTERIOR THIGH
LOCATION SIMPLE: ABDOMEN
LOCATION SIMPLE: RIGHT POPLITEAL SKIN
LOCATION SIMPLE: LEFT UPPER BACK
LOCATION SIMPLE: RIGHT UPPER ARM
LOCATION SIMPLE: LEFT PRETIBIAL REGION
LOCATION SIMPLE: LEFT UPPER ARM
LOCATION SIMPLE: RIGHT LIP

## 2023-05-23 ASSESSMENT — LOCATION ZONE DERM
LOCATION ZONE: FACE
LOCATION ZONE: ARM
LOCATION ZONE: LEG
LOCATION ZONE: TRUNK
LOCATION ZONE: NOSE
LOCATION ZONE: LIP

## 2023-05-23 NOTE — HPI: FULL BODY SKIN EXAMINATION
How Severe Are Your Spot(S)?: moderate
What Type Of Note Output Would You Prefer (Optional)?: Standard Output
What Is The Reason For Today's Visit?: Initial Full Body Skin Examination
What Is The Reason For Today's Visit? (Being Monitored For X): the development of a new lesion

## 2023-09-06 ENCOUNTER — DOCUMENTATION (OUTPATIENT)
Dept: HEALTH INFORMATION MANAGEMENT | Facility: OTHER | Age: 73
End: 2023-09-06

## 2024-06-11 ENCOUNTER — APPOINTMENT (RX ONLY)
Dept: URBAN - NONMETROPOLITAN AREA CLINIC 15 | Facility: CLINIC | Age: 74
Setting detail: DERMATOLOGY
End: 2024-06-11

## 2024-06-11 DIAGNOSIS — L81.4 OTHER MELANIN HYPERPIGMENTATION: ICD-10-CM

## 2024-06-11 DIAGNOSIS — R60.0 LOCALIZED EDEMA: ICD-10-CM

## 2024-06-11 DIAGNOSIS — D22 MELANOCYTIC NEVI: ICD-10-CM

## 2024-06-11 DIAGNOSIS — L82.1 OTHER SEBORRHEIC KERATOSIS: ICD-10-CM

## 2024-06-11 DIAGNOSIS — Z71.89 OTHER SPECIFIED COUNSELING: ICD-10-CM

## 2024-06-11 DIAGNOSIS — D18.0 HEMANGIOMA: ICD-10-CM

## 2024-06-11 PROBLEM — D22.5 MELANOCYTIC NEVI OF TRUNK: Status: ACTIVE | Noted: 2024-06-11

## 2024-06-11 PROBLEM — D18.01 HEMANGIOMA OF SKIN AND SUBCUTANEOUS TISSUE: Status: ACTIVE | Noted: 2024-06-11

## 2024-06-11 PROBLEM — D22.72 MELANOCYTIC NEVI OF LEFT LOWER LIMB, INCLUDING HIP: Status: ACTIVE | Noted: 2024-06-11

## 2024-06-11 PROBLEM — D22.61 MELANOCYTIC NEVI OF RIGHT UPPER LIMB, INCLUDING SHOULDER: Status: ACTIVE | Noted: 2024-06-11

## 2024-06-11 PROBLEM — D22.71 MELANOCYTIC NEVI OF RIGHT LOWER LIMB, INCLUDING HIP: Status: ACTIVE | Noted: 2024-06-11

## 2024-06-11 PROBLEM — D22.62 MELANOCYTIC NEVI OF LEFT UPPER LIMB, INCLUDING SHOULDER: Status: ACTIVE | Noted: 2024-06-11

## 2024-06-11 PROCEDURE — ? COUNSELING

## 2024-06-11 PROCEDURE — 99213 OFFICE O/P EST LOW 20 MIN: CPT

## 2024-06-11 ASSESSMENT — LOCATION DETAILED DESCRIPTION DERM
LOCATION DETAILED: RIGHT MEDIAL SUPERIOR CHEST
LOCATION DETAILED: RIGHT POPLITEAL SKIN
LOCATION DETAILED: LEFT DISTAL POSTERIOR UPPER ARM
LOCATION DETAILED: RIGHT SUPERIOR UPPER BACK
LOCATION DETAILED: LEFT POPLITEAL SKIN
LOCATION DETAILED: RIGHT LATERAL SUPERIOR CHEST
LOCATION DETAILED: SUBXIPHOID
LOCATION DETAILED: RIGHT ANTERIOR DISTAL UPPER ARM
LOCATION DETAILED: LEFT VENTRAL LATERAL PROXIMAL FOREARM
LOCATION DETAILED: RIGHT PROXIMAL POSTERIOR UPPER ARM
LOCATION DETAILED: LEFT DISTAL DORSAL FOREARM
LOCATION DETAILED: LEFT DISTAL CALF
LOCATION DETAILED: LEFT PROXIMAL PRETIBIAL REGION
LOCATION DETAILED: LOWER STERNUM
LOCATION DETAILED: LEFT LATERAL ELBOW
LOCATION DETAILED: LEFT INFERIOR FOREHEAD
LOCATION DETAILED: RIGHT VENTRAL PROXIMAL FOREARM
LOCATION DETAILED: LEFT PROXIMAL POSTERIOR UPPER ARM
LOCATION DETAILED: RIGHT DISTAL POSTERIOR UPPER ARM
LOCATION DETAILED: RIGHT INFERIOR UPPER BACK
LOCATION DETAILED: INFERIOR THORACIC SPINE
LOCATION DETAILED: RIGHT DISTAL PRETIBIAL REGION
LOCATION DETAILED: RIGHT ANTERIOR PROXIMAL THIGH
LOCATION DETAILED: RIGHT CENTRAL EYEBROW
LOCATION DETAILED: LEFT DISTAL PRETIBIAL REGION
LOCATION DETAILED: RIGHT ANTERIOR DISTAL THIGH
LOCATION DETAILED: LEFT DISTAL POSTERIOR THIGH
LOCATION DETAILED: MIDDLE STERNUM
LOCATION DETAILED: PERIUMBILICAL SKIN
LOCATION DETAILED: LEFT CENTRAL MALAR CHEEK
LOCATION DETAILED: RIGHT DISTAL POSTERIOR THIGH
LOCATION DETAILED: RIGHT PROXIMAL PRETIBIAL REGION
LOCATION DETAILED: RIGHT PROXIMAL DORSAL FOREARM

## 2024-06-11 ASSESSMENT — LOCATION SIMPLE DESCRIPTION DERM
LOCATION SIMPLE: LEFT ELBOW
LOCATION SIMPLE: LEFT POSTERIOR THIGH
LOCATION SIMPLE: UPPER BACK
LOCATION SIMPLE: CHEST
LOCATION SIMPLE: LEFT CHEEK
LOCATION SIMPLE: LEFT PRETIBIAL REGION
LOCATION SIMPLE: RIGHT THIGH
LOCATION SIMPLE: RIGHT FOREARM
LOCATION SIMPLE: LEFT POPLITEAL SKIN
LOCATION SIMPLE: ABDOMEN
LOCATION SIMPLE: LEFT FOREARM
LOCATION SIMPLE: RIGHT POPLITEAL SKIN
LOCATION SIMPLE: LEFT POSTERIOR UPPER ARM
LOCATION SIMPLE: RIGHT UPPER BACK
LOCATION SIMPLE: RIGHT PRETIBIAL REGION
LOCATION SIMPLE: LEFT FOREHEAD
LOCATION SIMPLE: LEFT CALF
LOCATION SIMPLE: RIGHT EYEBROW
LOCATION SIMPLE: RIGHT UPPER ARM
LOCATION SIMPLE: RIGHT POSTERIOR THIGH
LOCATION SIMPLE: RIGHT POSTERIOR UPPER ARM

## 2024-06-11 ASSESSMENT — LOCATION ZONE DERM
LOCATION ZONE: TRUNK
LOCATION ZONE: FACE
LOCATION ZONE: LEG
LOCATION ZONE: ARM

## 2024-07-17 PROBLEM — M17.9 OSTEOARTHRITIS, KNEE: Status: ACTIVE | Noted: 2024-07-17

## 2024-08-21 PROBLEM — M17.12 PRIMARY OSTEOARTHRITIS OF LEFT KNEE: Status: ACTIVE | Noted: 2024-07-17

## 2024-10-11 ENCOUNTER — RESEARCH ENCOUNTER (OUTPATIENT)
Dept: RESEARCH | Facility: MEDICAL CENTER | Age: 74
End: 2024-10-11

## 2024-10-11 DIAGNOSIS — Z00.6 CLINICAL TRIAL PARTICIPANT: ICD-10-CM

## 2024-10-17 ENCOUNTER — HOSPITAL ENCOUNTER (OUTPATIENT)
Dept: LAB | Facility: MEDICAL CENTER | Age: 74
End: 2024-10-17
Attending: FAMILY MEDICINE

## 2024-10-17 DIAGNOSIS — Z00.6 CLINICAL TRIAL PARTICIPANT: ICD-10-CM

## 2024-10-25 LAB
ELF SCORE: 9.71 -
HA (HYALURONIC ACID): 122.58 NG/ML
PIIINP (AMINO-TERMINAL PROPEPTIDE): 5.31 NG/ML
RELATIVE RISK: NORMAL
RISK GROUP: NORMAL
RISK: 3.3 %
TIMP-1 (TISSUE INHIBITOR OF MMP1): 201.5 NG/ML

## 2024-10-29 LAB
APOB+LDLR+PCSK9 GENE MUT ANL BLD/T: NOT DETECTED
BRCA1+BRCA2 DEL+DUP + FULL MUT ANL BLD/T: NOT DETECTED
MLH1+MSH2+MSH6+PMS2 GN DEL+DUP+FUL M: NOT DETECTED

## (undated) DEVICE — KIT ROOM DECONTAMINATION

## (undated) DEVICE — TUBE NG SALEM SUMP 16FR (50EA/CA)

## (undated) DEVICE — DRAPE MAGNETIC (INSTRA-MAG) - (30/CA)

## (undated) DEVICE — NEPTUNE 4 PORT MANIFOLD - (20/PK)

## (undated) DEVICE — SET SUCTION/IRRIGATION WITH DISPOSABLE TIP (6/CA )PART #0250-070-520 IS A SUB

## (undated) DEVICE — ELECTRODE 850 FOAM ADHESIVE - HYDROGEL RADIOTRNSPRNT (50/PK)

## (undated) DEVICE — SUCTION INSTRUMENT YANKAUER BULBOUS TIP W/O VENT (50EA/CA)

## (undated) DEVICE — BAG RETRIEVAL LARGE 10EA/BX

## (undated) DEVICE — CHLORAPREP 26 ML APPLICATOR - ORANGE TINT(25/CA)

## (undated) DEVICE — LACTATED RINGERS INJ 1000 ML - (14EA/CA 60CA/PF)

## (undated) DEVICE — HEAD HOLDER JUNIOR/ADULT

## (undated) DEVICE — DRAPESURG STERI-DRAPE LONG - (10/BX 4BX/CA)

## (undated) DEVICE — SPONGE GAUZESTER. 2X2 4-PL - (2/PK 50PK/BX 30BX/CS)

## (undated) DEVICE — CONNECTOR HUBLESS DRAINAGE - ONE WAY (20/BX)

## (undated) DEVICE — SET LEADWIRE 5 LEAD BEDSIDE DISPOSABLE ECG (1SET OF 5/EA)

## (undated) DEVICE — SLEEVE, VASO, THIGH, MED

## (undated) DEVICE — KIT ANESTHESIA W/CIRCUIT & 3/LT BAG W/FILTER (20EA/CA)

## (undated) DEVICE — GLOVE BIOGEL SZ 7.5 SURGICAL PF LTX - (50PR/BX 4BX/CA)

## (undated) DEVICE — GLOVE BIOGEL M SZ 8 SURGICAL PF LTX - (50/BX 4BX/CA)

## (undated) DEVICE — SPONGE GAUZESTER 4 X 4 4PLY - (128PK/CA)

## (undated) DEVICE — TROCAR 5X100 NON BLADED Z-TH - READ KII (6/BX)

## (undated) DEVICE — MASK ANESTHESIA ADULT  - (100/CA)

## (undated) DEVICE — TROCARCANN&SEAL 5X55 ZTHREAD - 12/BX

## (undated) DEVICE — SYRINGE 10 ML CONTROL LL (25EA/BX 4BX/CA)

## (undated) DEVICE — TUBING CLEARLINK DUO-VENT - C-FLO (48EA/CA)

## (undated) DEVICE — SODIUM CHL IRRIGATION 0.9% 1000ML (12EA/CA)

## (undated) DEVICE — TOWELS CLOTH SURGICAL - (4/PK 20PK/CA)

## (undated) DEVICE — CONNECTOR Y TBG CRTY 5 IN 1 STERILE (50EA/CA)

## (undated) DEVICE — TUBE ENDOBRONCHEAL 35FR - (1/BX)

## (undated) DEVICE — SET EXTENSION WITH 2 PORTS (48EA/CA) ***PART #2C8610 IS A SUBSTITUTE*****

## (undated) DEVICE — BOVIE  BLADE 6 EXTENDED - (50/PK)

## (undated) DEVICE — SUTURE 4-0 VICRYL PLUS FS-2 - 27 INCH (36/BX)

## (undated) DEVICE — ELECTRODE DUAL RETURN W/ CORD - (50/PK)

## (undated) DEVICE — GLOVE BIOGEL PI INDICATOR SZ 8.0 SURGICAL PF LF -(50/BX 4BX/CA)

## (undated) DEVICE — SUTURE 0 SILK CT-1 (36PK/BX)

## (undated) DEVICE — DRAPE CHEST/BREAST - (12EA/CA)

## (undated) DEVICE — GLOVE BIOGEL SZ 8 SURGICAL PF LTX - (50PR/BX 4BX/CA)

## (undated) DEVICE — CANNULA W/SEAL 5X100 Z-THRE - ADED KII (12/BX)

## (undated) DEVICE — Device

## (undated) DEVICE — PACK MAJOR BASIN - (2EA/CA)

## (undated) DEVICE — SPONGE DRAIN 4 X 4IN 6-PLY - (2/PK25PK/BX12BX/CS)

## (undated) DEVICE — STAPLER SKIN DISP - (6/BX 10BX/CA) VISISTAT

## (undated) DEVICE — TROCAR 12MM THORACOPORT (6EA/BX)

## (undated) DEVICE — CANISTER SUCTION 3000ML MECHANICAL FILTER AUTO SHUTOFF MEDI-VAC NONSTERILE LF DISP  (40EA/CA)

## (undated) DEVICE — GOWN WARMING STANDARD FLEX - (30/CA)

## (undated) DEVICE — SOD. CHL. INJ. 0.9% 1000 ML - (14EA/CA 60CA/PF)

## (undated) DEVICE — DRAIN CHEST ADULT (6EA/CA) DELETED ITEM  ORDER #15909

## (undated) DEVICE — TROCAR SEPARATOR 5X55 - 6/BX

## (undated) DEVICE — SUTURE 0 SILK TIES (36PK/BX)

## (undated) DEVICE — GLOVE BIOGEL INDICATOR SZ 8 SURGICAL PF LTX - (50/BX 4BX/CA)

## (undated) DEVICE — WATER IRRIG. STER. 1500 ML - (9/CA)

## (undated) DEVICE — DRESSING TRANSPARENT FILM TEGADERM 2.375 X 2.75"  (100EA/BX)"

## (undated) DEVICE — SUTURE GENERAL

## (undated) DEVICE — STAPLER POWERED 45MM (3EA/BX)

## (undated) DEVICE — PACK LAP CHOLE OR - (2EA/CA)

## (undated) DEVICE — PROTECTOR ULNA NERVE - (36PR/CA)

## (undated) DEVICE — DRESSING NON-ADHERING 8 X 3 - (50/BX)

## (undated) DEVICE — DRAPE IOBAN II INCISE 23X17 - (10EA/BX 4BX/CA)

## (undated) DEVICE — STAPLE 45MM BLUE 3.5MM ECHELON (12EA/BX)

## (undated) DEVICE — GLOVE SZ 7.5 BIOGEL PI MICRO - PF LF (50PR/BX)

## (undated) DEVICE — SUTURE 2-0 VICRYL PLUS CT-2 - 27 INCH (36/BX)

## (undated) DEVICE — SENSOR SPO2 NEO LNCS ADHESIVE (20/BX) SEE USER NOTES